# Patient Record
Sex: FEMALE | Race: WHITE | NOT HISPANIC OR LATINO | URBAN - NONMETROPOLITAN AREA
[De-identification: names, ages, dates, MRNs, and addresses within clinical notes are randomized per-mention and may not be internally consistent; named-entity substitution may affect disease eponyms.]

---

## 2018-05-23 ENCOUNTER — OFFICE VISIT (OUTPATIENT)
Dept: OBSTETRICS AND GYNECOLOGY | Facility: CLINIC | Age: 28
End: 2018-05-23

## 2018-05-23 VITALS
HEIGHT: 64 IN | BODY MASS INDEX: 28.1 KG/M2 | RESPIRATION RATE: 14 BRPM | WEIGHT: 164.6 LBS | DIASTOLIC BLOOD PRESSURE: 78 MMHG | SYSTOLIC BLOOD PRESSURE: 102 MMHG | HEART RATE: 84 BPM

## 2018-05-23 DIAGNOSIS — N92.0 MENORRHAGIA WITH REGULAR CYCLE: Primary | ICD-10-CM

## 2018-05-23 DIAGNOSIS — N94.6 DYSMENORRHEA: ICD-10-CM

## 2018-05-23 PROCEDURE — 99213 OFFICE O/P EST LOW 20 MIN: CPT | Performed by: NURSE PRACTITIONER

## 2018-05-23 RX ORDER — NORETHINDRONE ACETATE AND ETHINYL ESTRADIOL 1MG-20(21)
1 KIT ORAL DAILY
Qty: 28 TABLET | Refills: 3 | Status: SHIPPED | OUTPATIENT
Start: 2018-05-23 | End: 2018-08-30

## 2018-05-23 NOTE — PROGRESS NOTES
Debra Lovelace is a 28 y.o. female.     History of Present Illness   Pt presents with concerns about heavy painful periods. Patient's last menstrual period was 05/03/2018 (exact date). Lasted 10 days, extremely painful with clotting half dollar size. Her periods are typically regular with occasionally skipping 1 month. Periods associated with low back pain, cramping and at times painful bowel movements and intercourse.     Pt has been trying to conceive for 1.5 years. Not actively timing intercourse but not preventing pregnancy. She and her , who is present at visit, state they have decided to stop trying and focus on improving her symptoms. She conceived without problem within 6 months of stopping OCP at age 26.       In January, pt thought she may have miscarried after 2 months of amenorrhea. Never had a positive test. She has hx of ovarian cysts and mother had endometriosis. She believes she may have endometriosis too.     Last pap smear 2010.     The following portions of the patient's history were reviewed and updated as appropriate: allergies, current medications, past family history, past medical history, past social history, past surgical history and problem list.    Review of Systems   Constitutional: Negative.  Negative for activity change, chills, fatigue, fever, unexpected weight gain and unexpected weight loss.   Respiratory: Negative.    Cardiovascular: Negative.    Gastrointestinal: Negative for anal bleeding, blood in stool, constipation and diarrhea.        Painful bowel movements   Endocrine: Negative for cold intolerance and heat intolerance.   Genitourinary: Positive for menstrual problem. Negative for vaginal bleeding, vaginal discharge and vaginal pain. Dyspareunia: occasional. Pelvic pain: dysmenorrhea.   Neurological: Negative for dizziness, syncope and light-headedness.   Psychiatric/Behavioral: Negative.        Objective    Vitals:    05/23/18 0829   BP: 102/78   Pulse:  84   Resp: 14     1 05/23/18  0829   Weight: 74.7 kg (164 lb 9.6 oz)     Body mass index is 28.25 kg/m².    Physical Exam   Constitutional: She is oriented to person, place, and time. She appears well-developed and well-nourished.   Cardiovascular: Normal rate, regular rhythm and normal heart sounds.    Pulmonary/Chest: Effort normal and breath sounds normal.   Genitourinary:   Genitourinary Comments: Exam deferred   Neurological: She is alert and oriented to person, place, and time.   Psychiatric: She has a normal mood and affect. Her behavior is normal.   Vitals reviewed.        Assessment/Plan   Jasmin was seen today for menometrorrhagia and dysmenorrhea.    Diagnoses and all orders for this visit:    Menorrhagia with regular cycle  -     norethindrone-ethinyl estradiol FE (JUNEL FE 1/20) 1-20 MG-MCG per tablet; Take 1 tablet by mouth Daily.    Dysmenorrhea  -     norethindrone-ethinyl estradiol FE (JUNEL FE 1/20) 1-20 MG-MCG per tablet; Take 1 tablet by mouth Daily.    Pt does not want to conceive at this time and wishes to pursue contraceptive to help improve periods.     She was instructed how to start her birth control.  It should be started quick start today.  Because it may take 1 month to become effective, the use of alternative contraception for one month was stressed.  The potential for breakthrough bleeding for up to 3 cycles was also emphasized. Discussed what to do if 1 and 2 or more days of pills are missed. Mild side effects and ACHES warning signs discussed.    Ibuprofen 600mg q 6 hrs prn for cramping; start early to get ahead of pain. Encourage exercise, multivitamin, and increased water intake. Home remedies include hot showers, heating pads, etc.     Pt is due for well woman exam. RTC in 3 months for follow up on OCP and pap smear. Patient and  verbalize understanding. All questions were answered.

## 2018-08-30 ENCOUNTER — OFFICE VISIT (OUTPATIENT)
Dept: OBSTETRICS AND GYNECOLOGY | Facility: CLINIC | Age: 28
End: 2018-08-30

## 2018-08-30 VITALS
WEIGHT: 165.6 LBS | SYSTOLIC BLOOD PRESSURE: 118 MMHG | DIASTOLIC BLOOD PRESSURE: 80 MMHG | BODY MASS INDEX: 28.27 KG/M2 | RESPIRATION RATE: 16 BRPM | HEART RATE: 87 BPM | HEIGHT: 64 IN

## 2018-08-30 DIAGNOSIS — Z01.419 ENCOUNTER FOR GYNECOLOGICAL EXAMINATION WITH PAPANICOLAOU SMEAR OF CERVIX: Primary | ICD-10-CM

## 2018-08-30 DIAGNOSIS — Z78.9 ORAL CONTRACEPTIVE INTOLERANCE: ICD-10-CM

## 2018-08-30 PROCEDURE — G0123 SCREEN CERV/VAG THIN LAYER: HCPCS | Performed by: NURSE PRACTITIONER

## 2018-08-30 PROCEDURE — 99395 PREV VISIT EST AGE 18-39: CPT | Performed by: NURSE PRACTITIONER

## 2018-08-30 RX ORDER — ACETAMINOPHEN AND CODEINE PHOSPHATE 120; 12 MG/5ML; MG/5ML
1 SOLUTION ORAL DAILY
Qty: 28 TABLET | Refills: 12 | Status: SHIPPED | OUTPATIENT
Start: 2018-08-30 | End: 2019-01-11

## 2018-08-30 NOTE — PROGRESS NOTES
"Subjective   Chief Complaint   Patient presents with   • Gynecologic Exam     well woman annual visit   • Follow-up     OCP for dysmenorrhea and menometrorrhagia     Jasmin Lovelace is a 28 y.o. year old  presenting to be seen for her annual exam.  Today she informs me that she stopped taking OCP 3 days ago, not been sexually active since. She states all of her previous dysmenorrhea and menometrorrhagia resolved with Junel FE 1/20. Periods were on 1-2 days of spotting and no cramping. She had no side effects on the pill until the 4th pack. 2-3 days into the pack she noticed a migraine and nausea. She has headaches and thought she may be sick, so she continued her pill. After a week she began with dizziness and feeling \"spaced out\". She read that these can be side effects of OCPs and she stopped use. She no longer wants to TTC and wants to continue with contraceptive measures. I discussed that I am unsure that her symptoms were directly related to OCP given it did not start until 4 months after initiation. However, pt requests another form that would reduce the risk of headaches and nausea. We discussed all progesterone only options: pills, depo provera, nexplanon, IUDs. Pt declines any devices or injections and requests the pills.     Patient's last menstrual period was 2018 (exact date). States she is only spotting today.     OB History      Para Term  AB Living    1 1       1    SAB TAB Ectopic Molar Multiple Live Births                         Current birth control method: OCP (estrogen/progesterone).    She is sexually active.  In the past 12 months there has not been new sexual partners.  Condoms are not typically used.  She would not like to be screened for STD's at today's exam.     Past 6 month menstrual history:    Cycle Frequency: regular, predictable and consistent every 28 - 32 days   Menstrual cycle character: flow is typically light   Cycle Duration: 1 - 2   Number of heavy days " "of flows: 0   Dysmenorrhea: none and is not affecting her activities of daily living   PMS: is not affecting her activities of daily living   Intermenstrual bleeding present: no   Post-coital bleeding present: no     She exercises regularly: no.  She wears her seat belt:yes.  She has concerns about domestic violence: no.  Last colonoscopy: Never  Last DEXA: Never  Last MMG: Never  Last pap: 2010  History of abnormal PAP: No    The following portions of the patient's history were reviewed and updated as appropriate:problem list, current medications, allergies, past family history, past medical history, past social history and past surgical history.    Smoking status: Never Smoker                                                              Smokeless tobacco: Never Used                        History   Alcohol Use   • Yes      Review of Systems   Constitutional: Negative.  Negative for chills and fever.   Respiratory: Negative.    Cardiovascular: Negative.    Gastrointestinal: Negative.  Negative for nausea and vomiting.   Endocrine: Negative.    Genitourinary: Positive for vaginal bleeding (currently menstruating). Negative for menstrual problem, vaginal discharge and vaginal pain.   Skin: Negative.    Neurological: Positive for dizziness and headaches. Negative for syncope and light-headedness.        While on OCP during 4th pack   Psychiatric/Behavioral: Negative for suicidal ideas. The patient is not nervous/anxious.          Objective   /80 (BP Location: Right arm, Patient Position: Sitting, Cuff Size: Adult)   Pulse 87   Resp 16   Ht 162.6 cm (64\")   Wt 75.1 kg (165 lb 9.6 oz)   LMP 08/30/2018 (Exact Date)   Breastfeeding? No   BMI 28.43 kg/m²     General:  well developed; well nourished  no acute distress   Skin:  No suspicious lesions seen   Thyroid: not examined   Breasts:  Examined in supine position  Symmetric without masses or skin dimpling  Nipples normal without inversion, lesions or " discharge  There are no palpable axillary nodes  Fibrocystic changes are present both breasts without a discrete mass   Abdomen: soft, non-tender; no masses  no umbilical or inginual hernias are present  no hepato-splenomegaly  Normal findings: bowel sounds normal   Cardiac: Heart sounds are normal.  Regular rate and rhythm without murmur, gallop or rub.   Resp: Normal expansion.  Clear to auscultation.  No rales, rhonchi, or wheezing.   Psych: alert,oriented, in NAD with a full range of affect, normal behavior and no psychotic features   Pelvis: Uterus:  Clinical staff was present for exam  External genitalia:  normal appearance of the external genitalia including Bartholin's and Ehrhardt's glands.  :  urethral meatus normal;  Vaginal:  normal pink mucosa without prolapse or lesions and blood present -  moderate amount and dark red, much more than spotting. Clot removed to visualize cervix. Warned that this may alter the pap test results. Pt voices understanding and wishes to proceed.  Cervix:  normal appearance.  Uterus:  normal size, shape and consistency  Adnexa:  normal bimanual exam of the adnexa.     Lab Review   No data reviewed    Imaging  No data reviewed       Jasmin was seen today for gynecologic exam and follow-up.    Diagnoses and all orders for this visit:    Encounter for gynecological examination with Papanicolaou smear of cervix  -     Liquid-based Pap Smear, Screening  Pap results: I will send card in mail or call if abnormal. RTC annually for well woman exams.     Oral contraceptive intolerance  -     norethindrone (MICRONOR) 0.35 MG tablet; Take 1 tablet by mouth Daily.  Pt desires progesterone only pills after discussion. See details above in HPI. I discussed the very unique differences between this and her previous combined pill. No hormone free week may result in irregular bleeding. Bleeding may be heavier than on combined pill. Cramping may return. We will try this for now, consider NuvaRing  if not doing well on POP. Stressed to pt the importance of taking at the EXACT SAME TIME every day. If off by more than 30 minutes in either direction, use back up for at least 72 hours. Pt voices complete understanding.  is present at visit and agrees to help remind her. All questions were answered. Pt to RTC with any concerns.       This note was electronically signed.    Marilee Holm, JOSE MANUEL  August 30, 2018

## 2018-09-04 LAB
LAB AP CASE REPORT: NORMAL
LAB AP GYN ADDITIONAL INFORMATION: NORMAL
LAB AP GYN OTHER FINDINGS: NORMAL
PATH INTERP SPEC-IMP: NORMAL
STAT OF ADQ CVX/VAG CYTO-IMP: NORMAL

## 2019-01-11 ENCOUNTER — TELEPHONE (OUTPATIENT)
Dept: FAMILY MEDICINE CLINIC | Facility: CLINIC | Age: 29
End: 2019-01-11

## 2019-01-11 ENCOUNTER — OFFICE VISIT (OUTPATIENT)
Dept: FAMILY MEDICINE CLINIC | Facility: CLINIC | Age: 29
End: 2019-01-11

## 2019-01-11 VITALS
BODY MASS INDEX: 28.24 KG/M2 | SYSTOLIC BLOOD PRESSURE: 120 MMHG | OXYGEN SATURATION: 98 % | TEMPERATURE: 98.4 F | WEIGHT: 165.4 LBS | DIASTOLIC BLOOD PRESSURE: 74 MMHG | HEIGHT: 64 IN | HEART RATE: 78 BPM

## 2019-01-11 DIAGNOSIS — N92.1 MENOMETRORRHAGIA: ICD-10-CM

## 2019-01-11 DIAGNOSIS — Z30.09 ENCOUNTER FOR GENERAL COUNSELING ON PRESCRIPTION OF ORAL CONTRACEPTIVES: Primary | ICD-10-CM

## 2019-01-11 PROCEDURE — 99213 OFFICE O/P EST LOW 20 MIN: CPT | Performed by: PHYSICIAN ASSISTANT

## 2019-01-11 NOTE — PROGRESS NOTES
Debra Lovelace is a 28 y.o. female.   Pt is new to me.  Contraception   Associated symptoms include abdominal pain, fatigue and nausea. Pertinent negatives include no anorexia, arthralgias, change in bowel habit, chest pain, chills, congestion, coughing, diaphoresis, fever, headaches, joint swelling, myalgias, neck pain, numbness, rash, sore throat, swollen glands, urinary symptoms, vertigo, visual change, vomiting or weakness.   Dysmenorrhea   This is a new problem. The current episode started more than 1 month ago. The problem occurs intermittently. The problem has been gradually worsening. Associated symptoms include abdominal pain, fatigue and nausea. Pertinent negatives include no anorexia, arthralgias, change in bowel habit, chest pain, chills, congestion, coughing, diaphoresis, fever, headaches, joint swelling, myalgias, neck pain, numbness, rash, sore throat, swollen glands, urinary symptoms, vertigo, visual change, vomiting or weakness. Nothing aggravates the symptoms. Treatments tried: oral contraceptives. The treatment provided mild relief.   Menorrhagia   This is a new problem. The current episode started 1 to 4 weeks ago. The problem occurs intermittently. The problem has been gradually worsening. Associated symptoms include abdominal pain, fatigue and nausea. Pertinent negatives include no anorexia, arthralgias, change in bowel habit, chest pain, chills, congestion, coughing, diaphoresis, fever, headaches, joint swelling, myalgias, neck pain, numbness, rash, sore throat, swollen glands, urinary symptoms, vertigo, visual change, vomiting or weakness. Nothing aggravates the symptoms. Treatments tried: oral contraceptive.      Pt is here today due to irregular menstrual cycle. Pt is accompanied today by her mother. Pt has been seeing JO[facility] MANUEL Valdes for gynecology. Pt has been trying progesterone only pill x 5 months. Pt complains it was doing well, but she has been having menstrual  cramps x this AM and has been having irregular menses. Pt states her periods have been occurring every 2 weeks. Pt states she had a period 2 weeks ago, lasting 4 days. Pt states her menses has begun again today. Pt states she has been having to use heavy flow pads, using x 2 per 4 hours. Pt declines nuvaring, IUD, depo provera injection, and nexplanon as she does not want to implement devices or injections. Pt has tried Junel x 1 year ago, but she had to stop this after 4 months due to migraines. Patient's LMP - began today.    OB History      Para Term  AB Living    1 1       1    SAB TAB Ectopic Molar Multiple Live Births                       Current birth control method: progesterone only pills (micronor 0.35mg tablet daily).    The following portions of the patient's history were reviewed and updated as appropriate: allergies, current medications, past family history, past medical history, past social history, past surgical history and problem list.    Review of Systems   Constitutional: Positive for fatigue. Negative for activity change, appetite change, chills, diaphoresis, fever, unexpected weight gain and unexpected weight loss.   HENT: Negative.  Negative for congestion and sore throat.    Eyes: Negative for blurred vision, double vision and visual disturbance.   Respiratory: Negative for apnea, cough, choking, chest tightness, shortness of breath, wheezing and stridor.    Cardiovascular: Negative for chest pain, palpitations and leg swelling.   Gastrointestinal: Positive for abdominal pain and nausea. Negative for abdominal distention, anorexia, change in bowel habit, constipation, diarrhea, vomiting and GERD.   Genitourinary: Positive for menorrhagia and menstrual problem. Negative for difficulty urinating, dyspareunia, dysuria and pelvic pain.   Musculoskeletal: Negative for arthralgias, joint swelling, myalgias and neck pain.   Skin: Negative for rash.   Neurological: Negative for  vertigo, weakness and numbness.       Objective   Physical Exam   Constitutional: She is oriented to person, place, and time. She appears well-developed and well-nourished. No distress.   HENT:   Head: Normocephalic and atraumatic.   Right Ear: External ear normal.   Left Ear: External ear normal.   Nose: Nose normal.   Mouth/Throat: Oropharynx is clear and moist. No oropharyngeal exudate.   Eyes: Conjunctivae and EOM are normal. Pupils are equal, round, and reactive to light.   Neck: Normal range of motion. Neck supple. No JVD present. No tracheal deviation present. No thyromegaly present.   Cardiovascular: Normal rate, regular rhythm, normal heart sounds and intact distal pulses. Exam reveals no gallop and no friction rub.   No murmur heard.  Pulmonary/Chest: Effort normal and breath sounds normal. No stridor. No respiratory distress. She has no wheezes. She has no rales. She exhibits no tenderness.   Abdominal: Soft. Bowel sounds are normal. She exhibits no distension and no mass. There is no tenderness. There is no rebound and no guarding. No hernia.   Musculoskeletal: Normal range of motion. She exhibits no edema, tenderness or deformity.   Lymphadenopathy:     She has no cervical adenopathy.   Neurological: She is alert and oriented to person, place, and time.   Skin: Skin is warm and dry. Capillary refill takes less than 2 seconds. No rash noted. She is not diaphoretic. No erythema. No pallor.   Psychiatric: She has a normal mood and affect. Her behavior is normal. Judgment and thought content normal.   Nursing note and vitals reviewed.        Assessment/Plan   Jasmin was seen today for contraception.    Diagnoses and all orders for this visit:    Menometrorrhagia  -     Norethin-Eth Estrad-Fe Biphas (LO LOESTRIN FE) 1 MG-10 MCG / 10 MCG tablet; Take 1 tablet by mouth Daily.    After discussing contraception options with pt, she states she would like to try a different combo oral contraceptive than  previously to determine if this caused her migraines and nausea as she experienced in August 2018. Pt wishes to pursue combo oral contraception to help improve periods.     Pt was instructed how to start her birth control. Advised pt it may take 1 month to become effective and the importance of alternative contraception for one month was emphasized. Advised pt on what to do if 1 and 2 or more days of pills are missed. Mild side effects and warning signs of oral contraception discussed.    Advised pt to continue ibuprofen 600mg q 6 hours prn for cramping for pain.     Advised pt she is to follow up with her women's health provider or myself in 2-3 months to discuss tolerance and symptom management with OCP.     Patient educated to follow up in 2-3 months or sooner than next scheduled appointment if symptoms worsen or do not improve. Patient stated understanding and has agreed with plan of care. After visit summary was printed and given to patient.         This document has been electronically signed by Toyin Vazquez PA-C on January 11, 2019 4:56 PM,.

## 2019-01-15 ENCOUNTER — TELEPHONE (OUTPATIENT)
Dept: FAMILY MEDICINE CLINIC | Facility: CLINIC | Age: 29
End: 2019-01-15

## 2019-01-15 RX ORDER — NORETHINDRONE ACETATE AND ETHINYL ESTRADIOL 1.5-30(21)
1 KIT ORAL DAILY
COMMUNITY
End: 2019-06-18 | Stop reason: DRUGHIGH

## 2019-01-15 NOTE — TELEPHONE ENCOUNTER
Called into the pt's pharmacy and changed her birthcontrol to a med that is covered under her insurance we cancelled the lo neisha oropeza and replaced it with Junel with iron

## 2019-06-18 ENCOUNTER — OFFICE VISIT (OUTPATIENT)
Dept: OBSTETRICS AND GYNECOLOGY | Facility: CLINIC | Age: 29
End: 2019-06-18

## 2019-06-18 VITALS
DIASTOLIC BLOOD PRESSURE: 68 MMHG | SYSTOLIC BLOOD PRESSURE: 116 MMHG | HEIGHT: 64 IN | WEIGHT: 169.2 LBS | HEART RATE: 83 BPM | BODY MASS INDEX: 28.89 KG/M2

## 2019-06-18 DIAGNOSIS — N76.0 ACUTE VAGINITIS: Primary | ICD-10-CM

## 2019-06-18 DIAGNOSIS — R51.9 FREQUENT HEADACHES: ICD-10-CM

## 2019-06-18 DIAGNOSIS — Z30.41 ORAL CONTRACEPTIVE PILL SURVEILLANCE: ICD-10-CM

## 2019-06-18 PROCEDURE — 87210 SMEAR WET MOUNT SALINE/INK: CPT | Performed by: NURSE PRACTITIONER

## 2019-06-18 PROCEDURE — 99214 OFFICE O/P EST MOD 30 MIN: CPT | Performed by: NURSE PRACTITIONER

## 2019-06-18 RX ORDER — SUMATRIPTAN 50 MG/1
TABLET, FILM COATED ORAL
Refills: 3 | COMMUNITY
Start: 2019-06-04 | End: 2019-12-04 | Stop reason: DRUGHIGH

## 2019-06-18 RX ORDER — METRONIDAZOLE 500 MG/1
500 TABLET ORAL 2 TIMES DAILY
Qty: 14 TABLET | Refills: 0 | Status: SHIPPED | OUTPATIENT
Start: 2019-06-18 | End: 2019-06-25

## 2019-06-18 RX ORDER — BUTALBITAL, ACETAMINOPHEN AND CAFFEINE 300; 40; 50 MG/1; MG/1; MG/1
1 CAPSULE ORAL 3 TIMES DAILY PRN
Refills: 0 | COMMUNITY
Start: 2019-06-04 | End: 2020-09-18

## 2019-06-18 RX ORDER — FLUCONAZOLE 150 MG/1
150 TABLET ORAL ONCE
Qty: 2 TABLET | Refills: 0 | Status: SHIPPED | OUTPATIENT
Start: 2019-06-18 | End: 2019-06-18

## 2019-06-19 NOTE — PROGRESS NOTES
"Debra Lovelace is a 29 y.o. female.     History of Present Illness   Pt presents with concerns about headaches on OCP Junel 1/20. Pt had this happen before but it didn't occur until the 4th month. We were unsure about a correlation but she wanted to change to POP pills. She took these for several months but the presented to [facility] while I was out with complaints of irregular bleeding and cramping. She was given LoLoEstrin 1/10 but it was $300/month, so she was given Junel 1/20 again. She never had a coupon card to try at the pharmacy. Headaches are frequent, several days a month in the 4th pack of Junel 1/20's. She was given Orbivan and imitrex but is having to take these frequently. She fears risks on Depo Provera, Nexplanon, and IUDs. She wishes to remain on OCPs if possible to try 1/10s with a coupon.    Pt also concerned about vaginal discharge and itching. Describes discharge as milky white with a \"bleach smell\". Itching is intense in the perineum.     Patient's last menstrual period was 05/31/2019 (approximate).     The following portions of the patient's history were reviewed and updated as appropriate: allergies, current medications, past family history, past medical history, past social history, past surgical history and problem list.    Review of Systems   Constitutional: Negative.  Negative for chills, fever and unexpected weight gain.   Respiratory: Negative.    Cardiovascular: Negative.    Gastrointestinal: Negative for nausea.   Genitourinary: Positive for vaginal discharge. Negative for dysuria, frequency, genital sores, menstrual problem, pelvic pain, vaginal bleeding and vaginal pain (itching).   Neurological: Positive for headache. Negative for syncope and light-headedness.       Objective    Vitals:    06/18/19 1102   BP: 116/68   Pulse: 83         06/18/19  1102   Weight: 76.7 kg (169 lb 3.2 oz)     Body mass index is 29.04 kg/m².    Physical Exam   Constitutional: She is oriented " to person, place, and time. She appears well-developed and well-nourished.   Cardiovascular: Normal rate, regular rhythm and normal heart sounds.   Pulmonary/Chest: Effort normal and breath sounds normal.   Genitourinary: Uterus normal and cervix normal. There is no rash, tenderness or lesion on the right labia. There is no rash, tenderness or lesion on the left labia. Right adnexum displays no mass, no tenderness and no fullness. Left adnexum displays no mass, no tenderness and no fullness. Vagina exhibits no lesion. No erythema, tenderness or bleeding in the vagina. No foreign body in the vagina. No signs of injury around the vagina. Vaginal discharge found.   Genitourinary Comments: Wet prep: positive for clue cells TNTC, hyphae, no trichomonads. Evaluated by KAROLINA Trent.    Neurological: She is alert and oriented to person, place, and time.   Vitals reviewed.        Assessment/Plan   Jasmin was seen today for contraception and vaginitis.    Diagnoses and all orders for this visit:    Acute vaginitis  -     metroNIDAZOLE (FLAGYL) 500 MG tablet; Take 1 tablet by mouth 2 (Two) Times a Day for 7 days. No alcohol up to 48 hours after last dose  -     fluconazole (DIFLUCAN) 150 MG tablet; Take 1 tablet by mouth 1 (One) Time for 1 dose. Repeat dose in 72 hours if still symptomatic    Oral contraceptive pill surveillance  -     Norethin-Eth Estrad-Fe Biphas (LO LOESTRIN FE) 1 MG-10 MCG / 10 MCG tablet; Take 1 tablet by mouth Daily.    Frequent headaches  -     Norethin-Eth Estrad-Fe Biphas (LO LOESTRIN FE) 1 MG-10 MCG / 10 MCG tablet; Take 1 tablet by mouth Daily.    Discussed options including POP again, Depo Provera, Nexplanon, IUDs or trying to see if cost is low enough for 1/10's with a coupon. Pt desires to try the coupon card first. I registered it to the patient and called in OwossoEstrin FE. Complete current pack then switch to new OCP.     Discussed findings of both BV and yeast on exam and wet prep.  Treat with Flagyl 500mg BID x 7 days and Diflucan 150mg one now and repeat in 72 hours. Pt to call if she needs more Diflucan after completion of treatment. Discussed vulvar hygiene guidelines. Pt to make changes accordingly. Pt agrees with plan of care.     RTC after 8/30/19 for complete well woman exam or sooner PRN.

## 2019-12-04 ENCOUNTER — OFFICE VISIT (OUTPATIENT)
Dept: OBSTETRICS AND GYNECOLOGY | Facility: CLINIC | Age: 29
End: 2019-12-04

## 2019-12-04 VITALS
HEART RATE: 88 BPM | DIASTOLIC BLOOD PRESSURE: 72 MMHG | SYSTOLIC BLOOD PRESSURE: 108 MMHG | BODY MASS INDEX: 28.41 KG/M2 | WEIGHT: 166.4 LBS | HEIGHT: 64 IN

## 2019-12-04 DIAGNOSIS — N76.0: Primary | ICD-10-CM

## 2019-12-04 PROCEDURE — 99214 OFFICE O/P EST MOD 30 MIN: CPT | Performed by: NURSE PRACTITIONER

## 2019-12-04 PROCEDURE — 87210 SMEAR WET MOUNT SALINE/INK: CPT | Performed by: NURSE PRACTITIONER

## 2019-12-04 RX ORDER — SUMATRIPTAN 100 MG/1
TABLET, FILM COATED ORAL
Refills: 6 | COMMUNITY
Start: 2019-10-30 | End: 2020-09-18 | Stop reason: ALTCHOICE

## 2019-12-04 RX ORDER — TOPIRAMATE 50 MG/1
CAPSULE, EXTENDED RELEASE ORAL
COMMUNITY
Start: 2019-12-03 | End: 2020-09-18 | Stop reason: DRUGHIGH

## 2019-12-04 RX ORDER — TOPIRAMATE 25 MG/1
1 CAPSULE, EXTENDED RELEASE ORAL
Refills: 5 | COMMUNITY
Start: 2019-12-02 | End: 2020-09-18 | Stop reason: DRUGHIGH

## 2019-12-04 RX ORDER — CLINDAMYCIN PHOSPHATE 20 MG/G
1 CREAM VAGINAL NIGHTLY
Qty: 40 G | Refills: 0 | Status: SHIPPED | OUTPATIENT
Start: 2019-12-04 | End: 2019-12-11

## 2019-12-04 RX ORDER — FLUCONAZOLE 150 MG/1
150 TABLET ORAL
Qty: 3 TABLET | Refills: 0 | Status: SHIPPED | OUTPATIENT
Start: 2019-12-04 | End: 2020-09-18

## 2019-12-04 RX ORDER — CITALOPRAM 20 MG/1
20 TABLET ORAL DAILY
Refills: 1 | COMMUNITY
Start: 2019-10-14 | End: 2020-09-18 | Stop reason: DRUGHIGH

## 2019-12-04 RX ORDER — FLUCONAZOLE 150 MG/1
150 TABLET ORAL
Qty: 3 TABLET | Refills: 0 | Status: SHIPPED | OUTPATIENT
Start: 2019-12-04 | End: 2019-12-04 | Stop reason: SDUPTHER

## 2019-12-04 RX ORDER — CLINDAMYCIN PHOSPHATE 20 MG/G
1 CREAM VAGINAL NIGHTLY
Qty: 40 G | Refills: 0 | Status: SHIPPED | OUTPATIENT
Start: 2019-12-04 | End: 2019-12-04 | Stop reason: SDUPTHER

## 2019-12-06 NOTE — PROGRESS NOTES
"Debra Lovelace is a 29 y.o. female.     History of Present Illness   Pt presents with complaints of vaginal swelling, pain, discharge, and burning. She describes it as \"feeling raw\". Pt is struggling to walk due to the pain when her pants are rubbing. Occurring x 3 weeks. Pt has tried Monistat 3 days and AZO for yeast. She now has a Monistat Ovule in. Has not had any relief. Symptoms are worsening. Had antibiotics over a month ago. She continues using Dove soap and following vulvar hygiene guidelines.     The following portions of the patient's history were reviewed and updated as appropriate: allergies, current medications, past family history, past medical history, past social history, past surgical history and problem list.    Review of Systems   Constitutional: Negative.  Negative for chills and fever.   Respiratory: Negative.    Cardiovascular: Negative.    Genitourinary: Positive for vaginal discharge and vaginal pain. Negative for dysuria, frequency, hematuria, menstrual problem (doing great on LoLoEstrin), pelvic pain, urgency and vaginal bleeding.        Swelling   Skin: Negative.  Negative for rash.       Objective    Vitals:    12/04/19 1122   BP: 108/72   Pulse: 88         12/04/19  1122   Weight: 75.5 kg (166 lb 6.4 oz)     Body mass index is 28.56 kg/m².    Physical Exam   Constitutional: She is oriented to person, place, and time. She appears well-developed and well-nourished.   Pt is \"waddling\" into the office to prevent pain from worsening when her pants rub the area of concern   Cardiovascular: Normal rate, regular rhythm and normal heart sounds.   Pulmonary/Chest: Effort normal and breath sounds normal.   Genitourinary: Uterus normal and cervix normal.       There is rash and tenderness on the right labia. There is no lesion, injury or Bartholin's cyst on the right labia. There is rash and tenderness on the left labia. There is no lesion, injury or Bartholin's cyst on the left labia. " Cervix does not exhibit motion tenderness. Right adnexum displays no mass, no tenderness and no fullness. Left adnexum displays no mass, no tenderness and no fullness. Vagina exhibits no lesion. There is erythema and tenderness in the vagina. No bleeding in the vagina. No foreign body in the vagina. No signs of injury around the vagina. Vaginal discharge (mix of thinker and milky thin white discharge. malodorous. ) found.   Genitourinary Comments: Edema of the labia minora and introitus. Wet prep: positive for clue cells and hyphae and WBC TNTC, no yeast buds or trichomonads. Evaluated by KAROLINA Trent.    Neurological: She is alert and oriented to person, place, and time.   Vitals reviewed.        Assessment/Plan   Jasmin was seen today for vaginitis.    Diagnoses and all orders for this visit:    Vulvovaginal infection  -     fluconazole (DIFLUCAN) 150 MG tablet; Take 1 tablet by mouth Every 72 (Seventy-Two) Hours.  -     clindamycin (CLEOCIN) 2 % vaginal cream; Insert 1 applicator into the vagina Every Night for 7 days.    Pt has taken Flagyl PO before and would prefer a cream over oral medication due to taste. Treat with clindamycin vaginal cream nightly x 7 nights. Take Diflucan now, on day 3 and day 7 of clindamycin use. Strict vulvar hygiene. No intercourse until treatment is completed and symptoms are resolved. Pt voices understanding and agreement with this plan of care. Pt is due for well woman exam. RTC after symptoms resolve for this.

## 2020-05-15 DIAGNOSIS — Z30.41 ORAL CONTRACEPTIVE PILL SURVEILLANCE: ICD-10-CM

## 2020-05-15 DIAGNOSIS — R51.9 FREQUENT HEADACHES: ICD-10-CM

## 2020-05-16 RX ORDER — NORETHINDRONE ACETATE AND ETHINYL ESTRADIOL, ETHINYL ESTRADIOL AND FERROUS FUMARATE 1MG-10(24)
KIT ORAL
Qty: 84 TABLET | Refills: 0 | Status: SHIPPED | OUTPATIENT
Start: 2020-05-16 | End: 2020-08-19

## 2020-08-18 DIAGNOSIS — Z30.41 ORAL CONTRACEPTIVE PILL SURVEILLANCE: ICD-10-CM

## 2020-08-18 DIAGNOSIS — R51.9 FREQUENT HEADACHES: ICD-10-CM

## 2020-08-19 RX ORDER — NORETHINDRONE ACETATE AND ETHINYL ESTRADIOL, ETHINYL ESTRADIOL AND FERROUS FUMARATE 1MG-10(24)
KIT ORAL
Qty: 28 TABLET | Refills: 0 | Status: SHIPPED | OUTPATIENT
Start: 2020-08-19 | End: 2020-09-18 | Stop reason: SDUPTHER

## 2020-09-18 ENCOUNTER — LAB (OUTPATIENT)
Dept: LAB | Facility: OTHER | Age: 30
End: 2020-09-18

## 2020-09-18 ENCOUNTER — OFFICE VISIT (OUTPATIENT)
Dept: OBSTETRICS AND GYNECOLOGY | Facility: CLINIC | Age: 30
End: 2020-09-18

## 2020-09-18 VITALS
SYSTOLIC BLOOD PRESSURE: 100 MMHG | HEART RATE: 81 BPM | BODY MASS INDEX: 28.71 KG/M2 | HEIGHT: 64 IN | WEIGHT: 168.2 LBS | DIASTOLIC BLOOD PRESSURE: 60 MMHG

## 2020-09-18 DIAGNOSIS — Z30.41 ORAL CONTRACEPTIVE PILL SURVEILLANCE: ICD-10-CM

## 2020-09-18 DIAGNOSIS — R51.9 FREQUENT HEADACHES: ICD-10-CM

## 2020-09-18 DIAGNOSIS — Z01.419 ENCOUNTER FOR GYNECOLOGICAL EXAMINATION WITH PAPANICOLAOU SMEAR OF CERVIX: Primary | ICD-10-CM

## 2020-09-18 PROCEDURE — 99395 PREV VISIT EST AGE 18-39: CPT | Performed by: NURSE PRACTITIONER

## 2020-09-18 RX ORDER — MONTELUKAST SODIUM 10 MG/1
10 TABLET ORAL
COMMUNITY
Start: 2020-08-26

## 2020-09-18 RX ORDER — CITALOPRAM 40 MG/1
40 TABLET ORAL DAILY
COMMUNITY
Start: 2020-09-03 | End: 2021-09-24 | Stop reason: DRUGHIGH

## 2020-09-18 RX ORDER — TOPIRAMATE 100 MG/1
1 CAPSULE, EXTENDED RELEASE ORAL DAILY
COMMUNITY
Start: 2020-09-03

## 2020-09-18 RX ORDER — ONDANSETRON 4 MG/1
TABLET, FILM COATED ORAL
COMMUNITY
Start: 2020-08-18

## 2020-09-18 RX ORDER — NORETHINDRONE ACETATE AND ETHINYL ESTRADIOL, ETHINYL ESTRADIOL AND FERROUS FUMARATE 1MG-10(24)
1 KIT ORAL DAILY
Qty: 28 TABLET | Refills: 12 | Status: SHIPPED | OUTPATIENT
Start: 2020-09-18 | End: 2021-09-24 | Stop reason: SDUPTHER

## 2020-09-18 RX ORDER — UBROGEPANT 50 MG/1
TABLET ORAL
COMMUNITY
Start: 2020-09-03

## 2020-09-18 NOTE — PROGRESS NOTES
Subjective   Chief Complaint   Patient presents with   • Gynecologic Exam     GAVINO Lovelace is a 30 y.o. year old  presenting to be seen for her annual exam.  Today she needs refills on OCP. She is doing well taking it continuously to suppress periods. Headaches are manageable at this time. She still voices understanding of topamax reducing OCP effectiveness.     No S/S of vaginitis. Last treated in 2019.    Patient's last menstrual period was 2020 (approximate). Had period during missed pills after wisdom teeth surgery. Back on track now.      OB History        1    Para   1    Term                AB        Living   1       SAB        TAB        Ectopic        Molar        Multiple        Live Births                    Current birth control method: OCP (estrogen/progesterone).    She is sexually active.  In the past 12 months there has not been new sexual partners.  Condoms are not typically used.  She would not like to be screened for STD's at today's exam.     Past 6 month menstrual history:    Cycle Frequency: infrequent  absent   Menstrual cycle character: flow has been absent   Cycle Duration: 0-5   Number of heavy days of flows: 0   Dysmenorrhea: none and is not affecting her activities of daily living   PMS: is not affecting her activities of daily living   Intermenstrual bleeding present: no   Post-coital bleeding present: no     She exercises regularly: no.  She wears her seat belt:yes.  She has concerns about domestic violence: no.  Last colonoscopy: Never  Last DEXA: Never  Last MMG: Never  Last pap: 2018  History of abnormal PAP: No    The following portions of the patient's history were reviewed and updated as appropriate:problem list, current medications, allergies, past family history, past medical history, past social history and past surgical history.    Social History    Tobacco Use      Smoking status: Never Smoker      Smokeless tobacco: Never  "Used    Social History     Substance and Sexual Activity   Alcohol Use Yes      Review of Systems   Constitutional: Negative.  Negative for chills and fever.   Respiratory: Negative.    Cardiovascular: Negative.    Gastrointestinal: Negative.  Negative for nausea and vomiting.   Endocrine: Negative.    Genitourinary: Negative for dyspareunia, dysuria, menstrual problem, pelvic pain, vaginal bleeding, vaginal discharge and vaginal pain.   Skin: Negative.    Neurological: Negative for dizziness, syncope, light-headedness and headaches (managed).   Psychiatric/Behavioral: Negative for suicidal ideas. The patient is not nervous/anxious.          Objective   /60   Pulse 81   Ht 162.6 cm (64\")   Wt 76.3 kg (168 lb 3.2 oz)   LMP 08/21/2020 (Approximate)   Breastfeeding No   BMI 28.87 kg/m²     General:  well developed; well nourished  no acute distress   Skin:  No suspicious lesions seen   Thyroid: not examined   Breasts:  Examined in supine position  Symmetric without masses or skin dimpling  Nipples normal without inversion, lesions or discharge  There are no palpable axillary nodes  Fibrocystic changes are present both breasts without a discrete mass   Abdomen: soft, non-tender; no masses  no umbilical or inginual hernias are present  no hepato-splenomegaly  Normal findings: bowel sounds normal   Cardiac: Heart sounds are normal.  Regular rate and rhythm without murmur, gallop or rub.   Resp: Normal expansion.  Clear to auscultation.  No rales, rhonchi, or wheezing.   Psych: alert,oriented, in NAD with a full range of affect, normal behavior and no psychotic features   Pelvis: Uterus:  Clinical staff was present for exam  External genitalia:  normal appearance of the external genitalia including Bartholin's and [place]'s glands.  :  urethral meatus normal;  Vaginal:  normal pink mucosa without prolapse or lesions  Cervix:  normal appearance.  Uterus:  normal size, shape and consistency  Adnexa:  normal " bimanual exam of the adnexa.  Rectal: normal external exam     Lab Review   No data reviewed    Imaging  No data reviewed       Jasmin was seen today for gynecologic exam.    Diagnoses and all orders for this visit:    Encounter for gynecological examination with Papanicolaou smear of cervix  -     Liquid-based Pap Smear, Screening    Oral contraceptive pill surveillance  -     Norethin-Eth Estrad-Fe Biphas (Lo Loestrin Fe) 1 MG-10 MCG / 10 MCG tablet; Take 1 tablet by mouth Daily.    Frequent headaches  -     Norethin-Eth Estrad-Fe Biphas (Lo Loestrin Fe) 1 MG-10 MCG / 10 MCG tablet; Take 1 tablet by mouth Daily.    Pap results: I will send card in mail or call if abnormal. RTC annually for well woman exams.     Continue OCP as prescribed. Pt voices understanding of interaction with Topamax. RTC with any concerns.     This note was electronically signed.    Marilee Holm, APRN  September 18, 2020

## 2020-09-24 LAB
LAB AP CASE REPORT: NORMAL
PATH INTERP SPEC-IMP: NORMAL

## 2021-09-24 ENCOUNTER — LAB (OUTPATIENT)
Dept: LAB | Facility: OTHER | Age: 31
End: 2021-09-24

## 2021-09-24 ENCOUNTER — OFFICE VISIT (OUTPATIENT)
Dept: OBSTETRICS AND GYNECOLOGY | Facility: CLINIC | Age: 31
End: 2021-09-24

## 2021-09-24 VITALS
HEART RATE: 97 BPM | SYSTOLIC BLOOD PRESSURE: 118 MMHG | HEIGHT: 64 IN | BODY MASS INDEX: 30.35 KG/M2 | WEIGHT: 177.8 LBS | DIASTOLIC BLOOD PRESSURE: 76 MMHG

## 2021-09-24 DIAGNOSIS — Z01.419 ENCOUNTER FOR GYNECOLOGICAL EXAMINATION WITH PAPANICOLAOU SMEAR OF CERVIX: Primary | ICD-10-CM

## 2021-09-24 DIAGNOSIS — Z30.41 ORAL CONTRACEPTIVE PILL SURVEILLANCE: ICD-10-CM

## 2021-09-24 PROCEDURE — 99395 PREV VISIT EST AGE 18-39: CPT | Performed by: NURSE PRACTITIONER

## 2021-09-24 RX ORDER — NORETHINDRONE ACETATE AND ETHINYL ESTRADIOL, ETHINYL ESTRADIOL AND FERROUS FUMARATE 1MG-10(24)
1 KIT ORAL DAILY
Qty: 28 TABLET | Refills: 12 | Status: SHIPPED | OUTPATIENT
Start: 2021-09-24 | End: 2022-12-02 | Stop reason: SDUPTHER

## 2021-09-24 RX ORDER — CITALOPRAM 20 MG/1
20 TABLET ORAL DAILY
COMMUNITY
Start: 2021-09-01 | End: 2022-12-02

## 2021-09-24 RX ORDER — MOMETASONE FUROATE 1 MG/G
CREAM TOPICAL
COMMUNITY
Start: 2021-08-13

## 2021-09-24 RX ORDER — FEXOFENADINE HCL 180 MG/1
180 TABLET ORAL DAILY
COMMUNITY
Start: 2021-08-13

## 2021-09-24 NOTE — PROGRESS NOTES
Subjective   Chief Complaint   Patient presents with   • Annual Exam     WWE   • Contraception     refill OCP     Jasmin Lovelace is a 31 y.o. year old  presenting to be seen for her annual exam.  Today she needs refills on OCP. She is doing well taking it continuously to suppress periods. Headaches are manageable at this time. She still voices understanding of topamax reducing OCP effectiveness.     No LMP recorded (lmp unknown). (Menstrual status: Oral contraceptives).     OB History        1    Para   1    Term                AB        Living   1       SAB        TAB        Ectopic        Molar        Multiple        Live Births                    Current birth control method: OCP (estrogen/progesterone).    She is sexually active.  In the past 12 months there has not been new sexual partners.  Condoms are not typically used.  She would not like to be screened for STD's at today's exam.     Past 6 month menstrual history:    Cycle Frequency: absent   Menstrual cycle character: flow has been absent   Cycle Duration: 0-0   Number of heavy days of flows: 0   Dysmenorrhea: none   PMS: is not affecting her activities of daily living   Intermenstrual bleeding present: no   Post-coital bleeding present: no     She exercises regularly: no.  She wears her seat belt:yes.  She has concerns about domestic violence: no.  Last colonoscopy: Never  Last DEXA: Never  Last MMG: Never  Last pap: 20  History of abnormal PAP: No    The following portions of the patient's history were reviewed and updated as appropriate:problem list, current medications, allergies, past family history, past medical history, past social history and past surgical history.    Social History    Tobacco Use      Smoking status: Never Smoker      Smokeless tobacco: Never Used    Social History     Substance and Sexual Activity   Alcohol Use Yes      Review of Systems   Constitutional: Negative.  Negative for chills and fever.  "  Respiratory: Negative.    Cardiovascular: Negative.    Gastrointestinal: Negative.  Negative for nausea and vomiting.   Endocrine: Negative.    Genitourinary: Negative for dyspareunia, dysuria, menstrual problem, pelvic pain, vaginal bleeding, vaginal discharge and vaginal pain.   Skin: Negative.    Neurological: Negative for dizziness, syncope, light-headedness and headaches (managed).   Psychiatric/Behavioral: Negative for suicidal ideas. The patient is not nervous/anxious.          Objective   /76   Pulse 97   Ht 162.6 cm (64\")   Wt 80.6 kg (177 lb 12.8 oz)   LMP  (LMP Unknown) Comment: period about 6 months ago  Breastfeeding No   BMI 30.52 kg/m²     General:  well developed; well nourished  no acute distress   Skin:  No suspicious lesions seen   Thyroid: not examined   Breasts:  Examined in supine position  Symmetric without masses or skin dimpling  Nipples normal without inversion, lesions or discharge  There are no palpable axillary nodes  Fibrocystic changes are present both breasts without a discrete mass   Abdomen: soft, non-tender; no masses  no umbilical or inginual hernias are present  no hepato-splenomegaly  Normal findings: bowel sounds normal   Cardiac: Heart sounds are normal.  Regular rate and rhythm without murmur, gallop or rub.   Resp: Normal expansion.  Clear to auscultation.  No rales, rhonchi, or wheezing.   Psych: alert,oriented, in NAD with a full range of affect, normal behavior and no psychotic features   Pelvis: Uterus:  Clinical staff was present for exam  External genitalia:  normal appearance of the external genitalia including Bartholin's and [place]'s glands.  :  urethral meatus normal;  Vaginal:  normal pink mucosa without prolapse or lesions  Cervix:  normal appearance, ectopy present  Uterus:  normal size, shape and consistency  Adnexa:  normal bimanual exam of the adnexa.  Rectal: normal external exam     Lab Review   No data reviewed    Imaging  No data " reviewed       Diagnoses and all orders for this visit:    1. Encounter for gynecological examination with Papanicolaou smear of cervix (Primary)  -     Liquid-based Pap Smear, Screening    2. Oral contraceptive pill surveillance  -     Norethin-Eth Estrad-Fe Biphas (Lo Loestrin Fe) 1 MG-10 MCG / 10 MCG tablet; Take 1 tablet by mouth Daily.  Dispense: 28 tablet; Refill: 12       Pap results: I will send card in mail or call if abnormal. RTC annually for well woman exams.    SBE encouraged monthly.     Pt is  and monogamous. No risk of STI.      Uses OCP for contraception. Continue OCP as prescribed. Pt voices understanding of interaction with Topamax. RTC with any concerns.     Continue following with PCP PRN. Pt has had COVID vaccination.     This note was electronically signed.    Marilee Holm, APRN  September 24, 2021

## 2021-09-28 LAB
LAB AP CASE REPORT: NORMAL
PATH INTERP SPEC-IMP: NORMAL

## 2022-12-02 ENCOUNTER — LAB (OUTPATIENT)
Dept: LAB | Facility: OTHER | Age: 32
End: 2022-12-02

## 2022-12-02 ENCOUNTER — OFFICE VISIT (OUTPATIENT)
Dept: GASTROENTEROLOGY | Facility: CLINIC | Age: 32
End: 2022-12-02

## 2022-12-02 ENCOUNTER — OFFICE VISIT (OUTPATIENT)
Dept: OBSTETRICS AND GYNECOLOGY | Facility: CLINIC | Age: 32
End: 2022-12-02

## 2022-12-02 VITALS
HEART RATE: 90 BPM | SYSTOLIC BLOOD PRESSURE: 120 MMHG | DIASTOLIC BLOOD PRESSURE: 82 MMHG | BODY MASS INDEX: 31.86 KG/M2 | HEIGHT: 64 IN | WEIGHT: 186.6 LBS

## 2022-12-02 VITALS
HEIGHT: 64 IN | WEIGHT: 186 LBS | BODY MASS INDEX: 31.76 KG/M2 | SYSTOLIC BLOOD PRESSURE: 120 MMHG | HEART RATE: 90 BPM | DIASTOLIC BLOOD PRESSURE: 82 MMHG

## 2022-12-02 DIAGNOSIS — K92.1: ICD-10-CM

## 2022-12-02 DIAGNOSIS — R10.814 LEFT LOWER QUADRANT ABDOMINAL TENDERNESS WITHOUT REBOUND TENDERNESS: Primary | ICD-10-CM

## 2022-12-02 DIAGNOSIS — Z30.41 ORAL CONTRACEPTIVE PILL SURVEILLANCE: ICD-10-CM

## 2022-12-02 DIAGNOSIS — K92.1 HEMATOCHEZIA: ICD-10-CM

## 2022-12-02 DIAGNOSIS — N91.2 AMENORRHEA DUE TO ORAL CONTRACEPTIVE: ICD-10-CM

## 2022-12-02 DIAGNOSIS — Z79.3 AMENORRHEA DUE TO ORAL CONTRACEPTIVE: ICD-10-CM

## 2022-12-02 DIAGNOSIS — Z01.419 ENCOUNTER FOR GYNECOLOGICAL EXAMINATION WITH PAPANICOLAOU SMEAR OF CERVIX: Primary | ICD-10-CM

## 2022-12-02 LAB
B-HCG UR QL: NEGATIVE
EXPIRATION DATE: NORMAL
INTERNAL NEGATIVE CONTROL: NORMAL
INTERNAL POSITIVE CONTROL: NORMAL
Lab: NORMAL

## 2022-12-02 PROCEDURE — 99203 OFFICE O/P NEW LOW 30 MIN: CPT | Performed by: PHYSICIAN ASSISTANT

## 2022-12-02 PROCEDURE — 99395 PREV VISIT EST AGE 18-39: CPT | Performed by: NURSE PRACTITIONER

## 2022-12-02 PROCEDURE — 81025 URINE PREGNANCY TEST: CPT | Performed by: NURSE PRACTITIONER

## 2022-12-02 RX ORDER — NORETHINDRONE ACETATE AND ETHINYL ESTRADIOL, ETHINYL ESTRADIOL AND FERROUS FUMARATE 1MG-10(24)
1 KIT ORAL DAILY
Qty: 28 TABLET | Refills: 12 | Status: SHIPPED | OUTPATIENT
Start: 2022-12-02

## 2022-12-02 RX ORDER — CETIRIZINE HYDROCHLORIDE 10 MG/1
10 TABLET ORAL DAILY
COMMUNITY

## 2022-12-02 RX ORDER — VENLAFAXINE HYDROCHLORIDE 37.5 MG/1
37.5 CAPSULE, EXTENDED RELEASE ORAL DAILY
COMMUNITY

## 2022-12-02 NOTE — PROGRESS NOTES
[facility]      Chief Complaint:   Chief Complaint   Patient presents with   • Black or Bloody Stool       Subjective     HPI:   Patient presents for evaluation of recent episode of hematochezia.  Patient states her abdomen began cramping 4 nights ago and she had a subsequent episode of passing dark red blood and clots without stool.  Patient states the blood also appeared to have mucus mixed with it.  Patient denies any further episodes of hematochezia.  Patient states longstanding history of constipation, typically has a bowel movement every 3 days.  Patient has never had an incident of hematochezia.  Patient states her abdominal pain today is 4/10.  Patient denies GERD symptoms, nausea/vomiting or dysphagia.  Patient denies a family history of GI tract diseases.    Plan: Patient will be scheduled for CT scan of the abdomen due to hematochezia and left lower quadrant tenderness.  Patient states she works 12-hour shifts and will be unable to have this completed until Wednesday.  Advised that if her pain worsened, bleeding recurred or she develop fever she should immediately go to the ER for further evaluation.  Patient voiced understanding.  I will see her in follow-up as soon as CT scan has been completed to review results and make further recommendations based upon those results.    Past Medical History:   Past Medical History:   Diagnosis Date   • Anxiety    • Bipolar disorder (HCC)    • Depression    • Endometriosis    • Ovarian cyst    • Urinary tract infection        Past Surgical History:  Past Surgical History:   Procedure Laterality Date   • TONSILLECTOMY     • WISDOM TOOTH EXTRACTION  08/2020       Family History:  Family History   Problem Relation Age of Onset   • Uterine cancer Mother    • Ovarian cancer Mother    • Breast cancer Paternal Grandmother        Social History:   reports that she has never smoked. She has never used smokeless tobacco. She reports  current alcohol use. She reports that she does not use drugs.    Medications:   Prior to Admission medications    Medication Sig Start Date End Date Taking? Authorizing Provider   cetirizine (zyrTEC) 10 MG tablet Take 10 mg by mouth Daily.   Yes Kuldeep Francisco MD   fexofenadine (ALLEGRA) 180 MG tablet Take 180 mg by mouth Daily. 8/13/21  Yes Kuldeep Francisco MD   mometasone (ELOCON) 0.1 % cream APPLY TOPICALLY TO AFFECTED AREA TWICE DAILY AVOID USE ON FACE 8/13/21  Yes Kuldeep Francisco MD   montelukast (SINGULAIR) 10 MG tablet Take 10 mg by mouth every night at bedtime. 8/26/20  Yes Kuldeep Francisco MD   Multiple Vitamin (MULTI-VITAMIN DAILY PO) Take  by mouth.   Yes Kuldeep Francisco MD   Norethin-Eth Estrad-Fe Biphas (Lo Loestrin Fe) 1 MG-10 MCG / 10 MCG tablet Take 1 tablet by mouth Daily. 12/2/22  Yes Marilee Holm APRN   ondansetron (ZOFRAN) 4 MG tablet TAKE 1 TABLET BY MOUTH EVERY 4 TO 6 HOURS AS NEEDED 8/18/20  Yes Kuldeep Francisco MD   Trokendi  MG capsule sustained-release 24 hr Take 1 capsule by mouth Daily. 9/3/20  Yes Kuldeep Francisco MD   ubrogepant tablet TAKE 1 TABLET BY MOUTH AT ONSET OF MIGRAINE AND IN 2 HOURS MAY REPEAT DOSE... DO NOT EXCEED 2 TABLETS IN A 24 HOUR PERIOD 9/3/20  Yes Kuldeep Francisco MD   venlafaxine XR (EFFEXOR-XR) 37.5 MG 24 hr capsule Take 37.5 mg by mouth Daily.   Yes Kuldeep Francisco MD   citalopram (CeleXA) 20 MG tablet Take 20 mg by mouth Daily. 9/1/21 12/2/22  Kuldeep Francisco MD   Loratadine (CLARITIN PO) Take  by mouth.  12/2/22  Kuldeep Francisco MD   Norethin-Eth Estrad-Fe Biphas (Lo Loestrin Fe) 1 MG-10 MCG / 10 MCG tablet Take 1 tablet by mouth Daily. 9/24/21 12/2/22  Marilee Holm APRN       Allergies:  Patient has no known allergies.    ROS:    Review of Systems   Constitutional: Negative.  Negative for fever and unexpected weight change.   HENT: Negative.    Eyes:  "Negative.    Respiratory: Negative.  Negative for shortness of breath.    Cardiovascular: Negative.  Negative for chest pain.   Gastrointestinal: Positive for abdominal distention, abdominal pain, blood in stool and constipation. Negative for anal bleeding, diarrhea, nausea, rectal pain and vomiting.   Endocrine: Negative.    Genitourinary: Negative.    Skin: Negative.    Neurological: Negative.    Hematological: Negative.    Psychiatric/Behavioral: Negative.      Objective     Blood pressure 120/82, pulse 90, height 162.6 cm (64\"), weight 84.4 kg (186 lb), not currently breastfeeding.    Physical Exam  Vitals and nursing note reviewed.   Constitutional:       Appearance: Normal appearance.   HENT:      Head: Normocephalic and atraumatic.   Eyes:      General: No scleral icterus.     Conjunctiva/sclera: Conjunctivae normal.   Cardiovascular:      Rate and Rhythm: Normal rate and regular rhythm.   Pulmonary:      Effort: Pulmonary effort is normal.      Breath sounds: Normal breath sounds.   Abdominal:      General: Bowel sounds are normal.      Palpations: Abdomen is soft.      Tenderness: There is abdominal tenderness in the left upper quadrant and left lower quadrant. There is no guarding or rebound.   Skin:     General: Skin is warm.      Coloration: Skin is not jaundiced.   Neurological:      General: No focal deficit present.      Mental Status: She is alert.   Psychiatric:         Behavior: Behavior normal.          Assessment & Plan   Diagnoses and all orders for this visit:    1. Left lower quadrant abdominal tenderness without rebound tenderness (Primary)  -     CT Abdomen Pelvis With Contrast; Future    2. Hematochezia  -     CT Abdomen Pelvis With Contrast; Future        * Surgery not found *     Diagnosis Plan   1. Left lower quadrant abdominal tenderness without rebound tenderness  CT Abdomen Pelvis With Contrast      2. Hematochezia  CT Abdomen Pelvis With Contrast          Anticipated Surgical " Procedure:  Orders Placed This Encounter   Procedures   • CT Abdomen Pelvis With Contrast     Standing Status:   Future     Standing Expiration Date:   12/2/2023     Order Specific Question:   Will Oral Contrast be needed for this procedure?     Answer:   Yes     Order Specific Question:   Patient Pregnant     Answer:   No       The risks, benefits, and alternatives of this procedure have been discussed with the patient or the responsible party- the patient understands and agrees to proceed.

## 2022-12-02 NOTE — PROGRESS NOTES
"Subjective   Chief Complaint   Patient presents with   • Gynecologic Exam     WWE   • Contraception     Refill OCP       Jasmin Lovelace is a 32 y.o. year old  presenting to be seen for her annual exam.  Today she needs refills on OCP. She is doing well taking it continuously to suppress periods. Headaches are manageable at this time. She still voices understanding of topamax reducing OCP effectiveness.     Has concerns about blood, mucus loose stool Tuesday. No BM since. She has upper abdominal tenderness. Never occurred before. Denies pain with her bowel movements. Typically has \"normal stools\" every 3 days. Denies any known hemorrhoids. Never seen GI before. She has had fluctuations in weight. Up and down 20lb without changes in her diet or exercise routine.     No LMP recorded (lmp unknown). (Menstrual status: Oral contraceptives).     OB History        2    Para   1    Term                AB   1    Living   1       SAB   1    IAB        Ectopic        Molar        Multiple        Live Births                    Current birth control method: OCP (estrogen/progesterone).    She is sexually active.  In the past 12 months there has not been new sexual partners.  Condoms are not typically used.  She would not like to be screened for STD's at today's exam.     Past 6 month menstrual history:    Cycle Frequency: absent   Menstrual cycle character: flow has been absent   Cycle Duration: 0-0   Number of heavy days of flows: 0   Dysmenorrhea: none   PMS: is not affecting her activities of daily living   Intermenstrual bleeding present: no   Post-coital bleeding present: no     She exercises regularly: no.  She wears her seat belt:yes.  She has concerns about domestic violence: no.  Last colonoscopy: Never  Last DEXA: Never  Last MMG: Never  Last pap: 21  History of abnormal PAP: No    The following portions of the patient's history were reviewed and updated as appropriate:problem list, current " "medications, allergies, past family history, past medical history, past social history and past surgical history.    Social History    Tobacco Use      Smoking status: Never      Smokeless tobacco: Never    Social History     Substance and Sexual Activity   Alcohol Use Yes      Review of Systems   Constitutional: Negative.  Negative for chills and fever.   Respiratory: Negative.    Cardiovascular: Negative.    Gastrointestinal: Positive for abdominal distention, abdominal pain, blood in stool and diarrhea. Negative for anal bleeding, constipation, nausea, rectal pain and vomiting.   Endocrine: Negative.    Genitourinary: Negative for dyspareunia, dysuria, menstrual problem, pelvic pain, vaginal bleeding, vaginal discharge and vaginal pain.   Skin: Negative.    Neurological: Negative for dizziness, syncope, light-headedness and headaches (managed).   Psychiatric/Behavioral: Negative for suicidal ideas. The patient is not nervous/anxious.          Objective   /82   Pulse 90   Ht 162.6 cm (64\")   Wt 84.6 kg (186 lb 9.6 oz)   LMP  (LMP Unknown)   Breastfeeding No   BMI 32.03 kg/m²     General:  well developed; well nourished  no acute distress  obese - Body mass index is 32.03 kg/m².   Skin:  No suspicious lesions seen   Thyroid: not examined   Breasts:  Examined in supine position  Symmetric without masses or skin dimpling  Nipples normal without inversion, lesions or discharge  There are no palpable axillary nodes  Fibrocystic changes are present both breasts without a discrete mass   Abdomen: no umbilical or inguinal hernias are present  no hepato-splenomegaly  Normal findings: bowel sounds normal  Abnormal findings: moderate tenderness in the epigastrium   Cardiac: Heart sounds are normal.  Regular rate and rhythm without murmur, gallop or rub.   Resp: Normal expansion.  Clear to auscultation.  No rales, rhonchi, or wheezing.   Psych: alert,oriented, in NAD with a full range of affect, normal behavior " and no psychotic features   Pelvis: Uterus:  Clinical staff was present for exam  External genitalia:  normal appearance of the external genitalia including Bartholin's and [place]'s glands.  :  urethral meatus normal;  Vaginal:  normal pink mucosa without prolapse or lesions  Cervix:  normal appearance, ectopy present  Uterus:  normal size, shape and consistency  Adnexa:  normal bimanual exam of the adnexa.  Rectal: normal external exam     Lab Review   No data reviewed    Imaging  No data reviewed       Diagnoses and all orders for this visit:    1. Encounter for gynecological examination with Papanicolaou smear of cervix (Primary)  -     LIQUID-BASED PAP SMEAR, [facility] ([facility])    2. Oral contraceptive pill surveillance  -     Norethin-Eth Estrad-Fe Biphas (Lo Loestrin Fe) 1 MG-10 MCG / 10 MCG tablet; Take 1 tablet by mouth Daily.  Dispense: 28 tablet; Refill: 12    3. Blood in stool, adrian  -     Ambulatory Referral to Gastroenterology       Pap results: I will send card in mail or call if abnormal. RTC annually for well woman exams.    SBE encouraged monthly.     Pt is  and monogamous. No risk of STI.      Uses OCP for contraception. Continue OCP as prescribed. Pt voices understanding of interaction with Topamax. RTC with any concerns.     Continue following with PCP PRN.     Referral made to [facility] for further evaluation of abdominal pain and blood, mucus in stools.     This note was electronically signed.    Marilee Holm, APRN  December 2, 2022

## 2022-12-06 LAB — REF LAB TEST METHOD: NORMAL

## 2022-12-09 ENCOUNTER — OFFICE VISIT (OUTPATIENT)
Dept: GASTROENTEROLOGY | Facility: CLINIC | Age: 32
End: 2022-12-09

## 2022-12-09 VITALS
WEIGHT: 188 LBS | SYSTOLIC BLOOD PRESSURE: 122 MMHG | BODY MASS INDEX: 32.1 KG/M2 | DIASTOLIC BLOOD PRESSURE: 84 MMHG | HEART RATE: 91 BPM | HEIGHT: 64 IN

## 2022-12-09 DIAGNOSIS — R10.13 EPIGASTRIC PAIN: ICD-10-CM

## 2022-12-09 DIAGNOSIS — R10.814 LEFT LOWER QUADRANT ABDOMINAL TENDERNESS WITHOUT REBOUND TENDERNESS: ICD-10-CM

## 2022-12-09 DIAGNOSIS — K92.1 HEMATOCHEZIA: Primary | ICD-10-CM

## 2022-12-09 DIAGNOSIS — K59.00 CONSTIPATION, UNSPECIFIED CONSTIPATION TYPE: ICD-10-CM

## 2022-12-09 DIAGNOSIS — K21.9 GASTROESOPHAGEAL REFLUX DISEASE, UNSPECIFIED WHETHER ESOPHAGITIS PRESENT: ICD-10-CM

## 2022-12-09 PROCEDURE — 99214 OFFICE O/P EST MOD 30 MIN: CPT | Performed by: PHYSICIAN ASSISTANT

## 2022-12-09 RX ORDER — POLYETHYLENE GLYCOL 3350 17 G/17G
17 POWDER, FOR SOLUTION ORAL DAILY
Qty: 30 PACKET | Refills: 1 | Status: SHIPPED | OUTPATIENT
Start: 2022-12-09

## 2022-12-09 RX ORDER — SODIUM CHLORIDE 9 MG/ML
40 INJECTION, SOLUTION INTRAVENOUS AS NEEDED
Status: CANCELLED | OUTPATIENT
Start: 2022-12-09

## 2022-12-09 RX ORDER — DEXTROSE AND SODIUM CHLORIDE 5; .45 G/100ML; G/100ML
30 INJECTION, SOLUTION INTRAVENOUS CONTINUOUS PRN
Status: CANCELLED | OUTPATIENT
Start: 2023-01-03

## 2022-12-09 NOTE — PROGRESS NOTES
[facility]      Chief Complaint:   Chief Complaint   Patient presents with   • Follow-up       Subjective     HPI:   Patient returns for follow-up after having CT scan completed due to left lower quadrant abdominal pain and tenderness.  Patient previously reported 1 episode of hematochezia with mucous  without bowel movement.  Patient reports longstanding history of constipation but no previous history of hematochezia.  Patient reports today that she is having epigastric pain.  She has a history of GERD but states symptoms are typically controlled.  Patient denies nausea or vomiting.    CT of the abdomen and pelvis with contrast shows no acute inflammatory process in the abdomen or pelvis.    Plan: Patient will be scheduled for EGD and colonoscopy for further evaluation of her epigastric pain, GERD, chronic constipation, left lower quadrant pain and hematochezia.  Patient was prescribed MiraLAX for patient.  Patient will follow-up after endoscopy has been completed for results and recommendations.    Past Medical History:   Past Medical History:   Diagnosis Date   • Anxiety    • Bipolar disorder (HCC)    • Depression    • Endometriosis    • Ovarian cyst    • Urinary tract infection        Past Surgical History:  Past Surgical History:   Procedure Laterality Date   • TONSILLECTOMY     • WISDOM TOOTH EXTRACTION  08/2020       Family History:  Family History   Problem Relation Age of Onset   • Uterine cancer Mother    • Ovarian cancer Mother    • Breast cancer Paternal Grandmother        Social History:   reports that she has never smoked. She has never used smokeless tobacco. She reports current alcohol use. She reports that she does not use drugs.    Medications:   Prior to Admission medications    Medication Sig Start Date End Date Taking? Authorizing Provider   cetirizine (zyrTEC) 10 MG tablet Take 10 mg by mouth Daily.    Provider, MD Kuldeep   fexofenadine (ALLEGRA)  180 MG tablet Take 180 mg by mouth Daily. 8/13/21   Kuldeep Francisco MD   mometasone (ELOCON) 0.1 % cream APPLY TOPICALLY TO AFFECTED AREA TWICE DAILY AVOID USE ON FACE 8/13/21   Kuldeep Francisco MD   montelukast (SINGULAIR) 10 MG tablet Take 10 mg by mouth every night at bedtime. 8/26/20   Kuldeep Francisco MD   Multiple Vitamin (MULTI-VITAMIN DAILY PO) Take  by mouth.    Kuldeep Francisco MD   Norethin-Eth Estrad-Fe Biphas (Lo Loestrin Fe) 1 MG-10 MCG / 10 MCG tablet Take 1 tablet by mouth Daily. 12/2/22   Marilee Holm APRN   ondansetron (ZOFRAN) 4 MG tablet TAKE 1 TABLET BY MOUTH EVERY 4 TO 6 HOURS AS NEEDED 8/18/20   Kuldeep Francisco MD   polyethylene glycol (MIRALAX) 17 g packet Take 17 g by mouth Daily. 12/9/22   Melinda Foster, PENG   Trokendi  MG capsule sustained-release 24 hr Take 1 capsule by mouth Daily. 9/3/20   Kuldeep Francisco MD   ubrogepant tablet TAKE 1 TABLET BY MOUTH AT ONSET OF MIGRAINE AND IN 2 HOURS MAY REPEAT DOSE... DO NOT EXCEED 2 TABLETS IN A 24 HOUR PERIOD 9/3/20   Kuldeep Francisco MD   venlafaxine XR (EFFEXOR-XR) 37.5 MG 24 hr capsule Take 37.5 mg by mouth Daily.    Kuldeep Francisco MD       Allergies:  Patient has no known allergies.    ROS:    Review of Systems   Constitutional: Negative.  Negative for fever and unexpected weight change.   HENT: Negative.  Negative for trouble swallowing.    Eyes: Negative.    Respiratory: Negative.  Negative for shortness of breath.    Cardiovascular: Negative.  Negative for chest pain.   Gastrointestinal: Positive for abdominal pain, blood in stool and constipation. Negative for abdominal distention, anal bleeding, diarrhea, nausea, rectal pain and vomiting.   Endocrine: Negative.    Genitourinary: Negative.    Skin: Negative.    Neurological: Negative.    Hematological: Negative.    Psychiatric/Behavioral: Negative.      Objective     Blood pressure 122/84, pulse 91, height 162.6 cm  "(64\"), weight 85.3 kg (188 lb), not currently breastfeeding.    Physical Exam  Vitals and nursing note reviewed.   Constitutional:       Appearance: Normal appearance.   HENT:      Head: Normocephalic.      Mouth/Throat:      Mouth: Mucous membranes are moist.      Pharynx: Oropharynx is clear.   Eyes:      General: No scleral icterus.  Cardiovascular:      Rate and Rhythm: Normal rate and regular rhythm.   Pulmonary:      Effort: Pulmonary effort is normal.      Breath sounds: Normal breath sounds.   Abdominal:      General: Bowel sounds are normal.      Palpations: Abdomen is soft.      Tenderness: There is abdominal tenderness in the right lower quadrant, suprapubic area and left lower quadrant. There is no right CVA tenderness, left CVA tenderness, guarding or rebound. Negative signs include Rovsing's sign, McBurney's sign, psoas sign and obturator sign.   Skin:     Coloration: Skin is not jaundiced.   Neurological:      General: No focal deficit present.      Mental Status: She is alert.   Psychiatric:         Behavior: Behavior normal.          Assessment & Plan   Diagnoses and all orders for this visit:    1. Hematochezia (Primary)  -     Case Request; Standing  -     sodium chloride 0.9 % infusion 40 mL  -     dextrose 5 % and sodium chloride 0.45 % infusion  -     Case Request    2. Left lower quadrant abdominal tenderness without rebound tenderness  -     Case Request; Standing  -     sodium chloride 0.9 % infusion 40 mL  -     dextrose 5 % and sodium chloride 0.45 % infusion  -     Case Request    3. Epigastric pain  -     Case Request; Standing  -     sodium chloride 0.9 % infusion 40 mL  -     dextrose 5 % and sodium chloride 0.45 % infusion  -     Case Request    4. Gastroesophageal reflux disease, unspecified whether esophagitis present  -     Case Request; Standing  -     sodium chloride 0.9 % infusion 40 mL  -     dextrose 5 % and sodium chloride 0.45 % infusion  -     Case Request    5. " Constipation, unspecified constipation type  -     Case Request; Standing  -     sodium chloride 0.9 % infusion 40 mL  -     dextrose 5 % and sodium chloride 0.45 % infusion  -     Case Request    Other orders  -     Follow Anesthesia Guidelines / Protocol; Future  -     Obtain Informed Consent; Future  -     Implement Anesthesia Orders Day of Procedure; Standing  -     Obtain Informed Consent; Standing  -     POC Glucose Once; Standing  -     Insert Peripheral IV; Standing  -     polyethylene glycol (MIRALAX) 17 g packet; Take 17 g by mouth Daily.  Dispense: 30 packet; Refill: 1        ESOPHAGOGASTRODUODENOSCOPY-bx (N/A), COLONOSCOPY- look at TI, bx (N/A)     Diagnosis Plan   1. Hematochezia  Case Request    sodium chloride 0.9 % infusion 40 mL    dextrose 5 % and sodium chloride 0.45 % infusion    Case Request      2. Left lower quadrant abdominal tenderness without rebound tenderness  Case Request    sodium chloride 0.9 % infusion 40 mL    dextrose 5 % and sodium chloride 0.45 % infusion    Case Request      3. Epigastric pain  Case Request    sodium chloride 0.9 % infusion 40 mL    dextrose 5 % and sodium chloride 0.45 % infusion    Case Request      4. Gastroesophageal reflux disease, unspecified whether esophagitis present  Case Request    sodium chloride 0.9 % infusion 40 mL    dextrose 5 % and sodium chloride 0.45 % infusion    Case Request      5. Constipation, unspecified constipation type  Case Request    sodium chloride 0.9 % infusion 40 mL    dextrose 5 % and sodium chloride 0.45 % infusion    Case Request          Anticipated Surgical Procedure:  Orders Placed This Encounter   Procedures   • Obtain Informed Consent     Standing Status:   Future     Order Specific Question:   Informed Consent Given For     Answer:   egd and colonoscopy       The risks, benefits, and alternatives of this procedure have been discussed with the patient or the responsible party- the patient understands and agrees to  proceed.

## 2022-12-09 NOTE — H&P (VIEW-ONLY)
[facility]      Chief Complaint:   Chief Complaint   Patient presents with   • Follow-up       Subjective     HPI:   Patient returns for follow-up after having CT scan completed due to left lower quadrant abdominal pain and tenderness.  Patient previously reported 1 episode of hematochezia with mucous  without bowel movement.  Patient reports longstanding history of constipation but no previous history of hematochezia.  Patient reports today that she is having epigastric pain.  She has a history of GERD but states symptoms are typically controlled.  Patient denies nausea or vomiting.    CT of the abdomen and pelvis with contrast shows no acute inflammatory process in the abdomen or pelvis.    Plan: Patient will be scheduled for EGD and colonoscopy for further evaluation of her epigastric pain, GERD, chronic constipation, left lower quadrant pain and hematochezia.  Patient was prescribed MiraLAX for patient.  Patient will follow-up after endoscopy has been completed for results and recommendations.    Past Medical History:   Past Medical History:   Diagnosis Date   • Anxiety    • Bipolar disorder (HCC)    • Depression    • Endometriosis    • Ovarian cyst    • Urinary tract infection        Past Surgical History:  Past Surgical History:   Procedure Laterality Date   • TONSILLECTOMY     • WISDOM TOOTH EXTRACTION  08/2020       Family History:  Family History   Problem Relation Age of Onset   • Uterine cancer Mother    • Ovarian cancer Mother    • Breast cancer Paternal Grandmother        Social History:   reports that she has never smoked. She has never used smokeless tobacco. She reports current alcohol use. She reports that she does not use drugs.    Medications:   Prior to Admission medications    Medication Sig Start Date End Date Taking? Authorizing Provider   cetirizine (zyrTEC) 10 MG tablet Take 10 mg by mouth Daily.    Provider, MD Kuldeep   fexofenadine (ALLEGRA)  180 MG tablet Take 180 mg by mouth Daily. 8/13/21   Kuldeep Francisco MD   mometasone (ELOCON) 0.1 % cream APPLY TOPICALLY TO AFFECTED AREA TWICE DAILY AVOID USE ON FACE 8/13/21   Kuldeep Francisco MD   montelukast (SINGULAIR) 10 MG tablet Take 10 mg by mouth every night at bedtime. 8/26/20   Kuldeep Francisco MD   Multiple Vitamin (MULTI-VITAMIN DAILY PO) Take  by mouth.    Kuldeep Francisco MD   Norethin-Eth Estrad-Fe Biphas (Lo Loestrin Fe) 1 MG-10 MCG / 10 MCG tablet Take 1 tablet by mouth Daily. 12/2/22   Marilee Holm APRN   ondansetron (ZOFRAN) 4 MG tablet TAKE 1 TABLET BY MOUTH EVERY 4 TO 6 HOURS AS NEEDED 8/18/20   Kuldeep Francisco MD   polyethylene glycol (MIRALAX) 17 g packet Take 17 g by mouth Daily. 12/9/22   Melinda Foster, PENG   Trokendi  MG capsule sustained-release 24 hr Take 1 capsule by mouth Daily. 9/3/20   Kuldeep Francisco MD   ubrogepant tablet TAKE 1 TABLET BY MOUTH AT ONSET OF MIGRAINE AND IN 2 HOURS MAY REPEAT DOSE... DO NOT EXCEED 2 TABLETS IN A 24 HOUR PERIOD 9/3/20   Kuldeep Francisco MD   venlafaxine XR (EFFEXOR-XR) 37.5 MG 24 hr capsule Take 37.5 mg by mouth Daily.    Kuldeep Francisco MD       Allergies:  Patient has no known allergies.    ROS:    Review of Systems   Constitutional: Negative.  Negative for fever and unexpected weight change.   HENT: Negative.  Negative for trouble swallowing.    Eyes: Negative.    Respiratory: Negative.  Negative for shortness of breath.    Cardiovascular: Negative.  Negative for chest pain.   Gastrointestinal: Positive for abdominal pain, blood in stool and constipation. Negative for abdominal distention, anal bleeding, diarrhea, nausea, rectal pain and vomiting.   Endocrine: Negative.    Genitourinary: Negative.    Skin: Negative.    Neurological: Negative.    Hematological: Negative.    Psychiatric/Behavioral: Negative.      Objective     Blood pressure 122/84, pulse 91, height 162.6 cm  (64\"), weight 85.3 kg (188 lb), not currently breastfeeding.    Physical Exam  Vitals and nursing note reviewed.   Constitutional:       Appearance: Normal appearance.   HENT:      Head: Normocephalic.      Mouth/Throat:      Mouth: Mucous membranes are moist.      Pharynx: Oropharynx is clear.   Eyes:      General: No scleral icterus.  Cardiovascular:      Rate and Rhythm: Normal rate and regular rhythm.   Pulmonary:      Effort: Pulmonary effort is normal.      Breath sounds: Normal breath sounds.   Abdominal:      General: Bowel sounds are normal.      Palpations: Abdomen is soft.      Tenderness: There is abdominal tenderness in the right lower quadrant, suprapubic area and left lower quadrant. There is no right CVA tenderness, left CVA tenderness, guarding or rebound. Negative signs include Rovsing's sign, McBurney's sign, psoas sign and obturator sign.   Skin:     Coloration: Skin is not jaundiced.   Neurological:      General: No focal deficit present.      Mental Status: She is alert.   Psychiatric:         Behavior: Behavior normal.          Assessment & Plan   Diagnoses and all orders for this visit:    1. Hematochezia (Primary)  -     Case Request; Standing  -     sodium chloride 0.9 % infusion 40 mL  -     dextrose 5 % and sodium chloride 0.45 % infusion  -     Case Request    2. Left lower quadrant abdominal tenderness without rebound tenderness  -     Case Request; Standing  -     sodium chloride 0.9 % infusion 40 mL  -     dextrose 5 % and sodium chloride 0.45 % infusion  -     Case Request    3. Epigastric pain  -     Case Request; Standing  -     sodium chloride 0.9 % infusion 40 mL  -     dextrose 5 % and sodium chloride 0.45 % infusion  -     Case Request    4. Gastroesophageal reflux disease, unspecified whether esophagitis present  -     Case Request; Standing  -     sodium chloride 0.9 % infusion 40 mL  -     dextrose 5 % and sodium chloride 0.45 % infusion  -     Case Request    5.  Constipation, unspecified constipation type  -     Case Request; Standing  -     sodium chloride 0.9 % infusion 40 mL  -     dextrose 5 % and sodium chloride 0.45 % infusion  -     Case Request    Other orders  -     Follow Anesthesia Guidelines / Protocol; Future  -     Obtain Informed Consent; Future  -     Implement Anesthesia Orders Day of Procedure; Standing  -     Obtain Informed Consent; Standing  -     POC Glucose Once; Standing  -     Insert Peripheral IV; Standing  -     polyethylene glycol (MIRALAX) 17 g packet; Take 17 g by mouth Daily.  Dispense: 30 packet; Refill: 1        ESOPHAGOGASTRODUODENOSCOPY-bx (N/A), COLONOSCOPY- look at TI, bx (N/A)     Diagnosis Plan   1. Hematochezia  Case Request    sodium chloride 0.9 % infusion 40 mL    dextrose 5 % and sodium chloride 0.45 % infusion    Case Request      2. Left lower quadrant abdominal tenderness without rebound tenderness  Case Request    sodium chloride 0.9 % infusion 40 mL    dextrose 5 % and sodium chloride 0.45 % infusion    Case Request      3. Epigastric pain  Case Request    sodium chloride 0.9 % infusion 40 mL    dextrose 5 % and sodium chloride 0.45 % infusion    Case Request      4. Gastroesophageal reflux disease, unspecified whether esophagitis present  Case Request    sodium chloride 0.9 % infusion 40 mL    dextrose 5 % and sodium chloride 0.45 % infusion    Case Request      5. Constipation, unspecified constipation type  Case Request    sodium chloride 0.9 % infusion 40 mL    dextrose 5 % and sodium chloride 0.45 % infusion    Case Request          Anticipated Surgical Procedure:  Orders Placed This Encounter   Procedures   • Obtain Informed Consent     Standing Status:   Future     Order Specific Question:   Informed Consent Given For     Answer:   egd and colonoscopy       The risks, benefits, and alternatives of this procedure have been discussed with the patient or the responsible party- the patient understands and agrees to  proceed.

## 2023-01-03 ENCOUNTER — HOSPITAL ENCOUNTER (OUTPATIENT)
Facility: HOSPITAL | Age: 33
Setting detail: HOSPITAL OUTPATIENT SURGERY
Discharge: HOME | End: 2023-01-03
Attending: INTERNAL MEDICINE | Admitting: INTERNAL MEDICINE
Payer: COMMERCIAL

## 2023-01-03 ENCOUNTER — ANESTHESIA EVENT (OUTPATIENT)
Dept: GASTROENTEROLOGY | Facility: HOSPITAL | Age: 33
End: 2023-01-03
Payer: COMMERCIAL

## 2023-01-03 ENCOUNTER — ANESTHESIA (OUTPATIENT)
Dept: GASTROENTEROLOGY | Facility: HOSPITAL | Age: 33
End: 2023-01-03
Payer: COMMERCIAL

## 2023-01-03 VITALS
HEART RATE: 94 BPM | SYSTOLIC BLOOD PRESSURE: 128 MMHG | WEIGHT: 187.39 LBS | RESPIRATION RATE: 18 BRPM | TEMPERATURE: 97.1 F | OXYGEN SATURATION: 98 % | BODY MASS INDEX: 31.99 KG/M2 | DIASTOLIC BLOOD PRESSURE: 73 MMHG | HEIGHT: 64 IN

## 2023-01-03 DIAGNOSIS — K59.00 CONSTIPATION, UNSPECIFIED CONSTIPATION TYPE: ICD-10-CM

## 2023-01-03 DIAGNOSIS — K92.1 HEMATOCHEZIA: ICD-10-CM

## 2023-01-03 DIAGNOSIS — K21.9 GASTROESOPHAGEAL REFLUX DISEASE, UNSPECIFIED WHETHER ESOPHAGITIS PRESENT: ICD-10-CM

## 2023-01-03 DIAGNOSIS — R10.13 EPIGASTRIC PAIN: ICD-10-CM

## 2023-01-03 DIAGNOSIS — R10.814 LEFT LOWER QUADRANT ABDOMINAL TENDERNESS WITHOUT REBOUND TENDERNESS: ICD-10-CM

## 2023-01-03 PROCEDURE — 25010000002 PROPOFOL 10 MG/ML EMULSION: Performed by: NURSE ANESTHETIST, CERTIFIED REGISTERED

## 2023-01-03 PROCEDURE — 43239 EGD BIOPSY SINGLE/MULTIPLE: CPT | Performed by: INTERNAL MEDICINE

## 2023-01-03 PROCEDURE — 88305 TISSUE EXAM BY PATHOLOGIST: CPT

## 2023-01-03 PROCEDURE — 45380 COLONOSCOPY AND BIOPSY: CPT | Performed by: INTERNAL MEDICINE

## 2023-01-03 RX ORDER — LIDOCAINE HYDROCHLORIDE 20 MG/ML
INJECTION, SOLUTION INTRAVENOUS AS NEEDED
Status: DISCONTINUED | OUTPATIENT
Start: 2023-01-03 | End: 2023-01-03 | Stop reason: SURG

## 2023-01-03 RX ORDER — DEXTROSE AND SODIUM CHLORIDE 5; .45 G/100ML; G/100ML
30 INJECTION, SOLUTION INTRAVENOUS CONTINUOUS PRN
Status: DISCONTINUED | OUTPATIENT
Start: 2023-01-03 | End: 2023-01-03 | Stop reason: HOSPADM

## 2023-01-03 RX ORDER — PROPOFOL 10 MG/ML
VIAL (ML) INTRAVENOUS AS NEEDED
Status: DISCONTINUED | OUTPATIENT
Start: 2023-01-03 | End: 2023-01-03 | Stop reason: SURG

## 2023-01-03 RX ADMIN — DEXTROSE AND SODIUM CHLORIDE 30 ML/HR: 5; 450 INJECTION, SOLUTION INTRAVENOUS at 12:21

## 2023-01-03 RX ADMIN — LIDOCAINE HYDROCHLORIDE 100 MG: 20 INJECTION, SOLUTION INTRAVENOUS at 12:39

## 2023-01-03 RX ADMIN — PROPOFOL 30 MG: 10 INJECTION, EMULSION INTRAVENOUS at 12:46

## 2023-01-03 RX ADMIN — PROPOFOL 20 MG: 10 INJECTION, EMULSION INTRAVENOUS at 12:49

## 2023-01-03 RX ADMIN — PROPOFOL 30 MG: 10 INJECTION, EMULSION INTRAVENOUS at 12:42

## 2023-01-03 RX ADMIN — PROPOFOL 20 MG: 10 INJECTION, EMULSION INTRAVENOUS at 12:44

## 2023-01-03 RX ADMIN — PROPOFOL 20 MG: 10 INJECTION, EMULSION INTRAVENOUS at 12:48

## 2023-01-03 RX ADMIN — PROPOFOL 80 MG: 10 INJECTION, EMULSION INTRAVENOUS at 12:39

## 2023-01-03 RX ADMIN — PROPOFOL 40 MG: 10 INJECTION, EMULSION INTRAVENOUS at 12:40

## 2023-01-03 NOTE — INTERVAL H&P NOTE
H&P reviewed. The patient was examined and there are no changes to the H&P.      Risks and benefits discussed with patient. Patient understands these and would like to proceed with procedure.

## 2023-01-03 NOTE — ANESTHESIA PREPROCEDURE EVALUATION
Anesthesia Evaluation     Patient summary reviewed and Nursing notes reviewed   NPO Solid Status: > 8 hours  NPO Liquid Status: > 2 hours           Airway   Mallampati: II  TM distance: >3 FB  Neck ROM: full  No difficulty expected  Dental - normal exam     Pulmonary - negative pulmonary ROS   Cardiovascular - negative cardio ROS and normal exam        Neuro/Psych  (+) seizures (Epilepsy (HCC) last seizure 2017 ), headaches, psychiatric history Depression, Bipolar and Anxiety,    GI/Hepatic/Renal/Endo    (+) obesity,  GERD,      Musculoskeletal (-) negative ROS    Abdominal   (+) obese,    Substance History   (+) alcohol use,      OB/GYN negative ob/gyn ROS   (-)  Pregnant        Other - negative ROS                       Anesthesia Plan    ASA 2     general   total IV anesthesia  intravenous induction     Anesthetic plan, risks, benefits, and alternatives have been provided, discussed and informed consent has been obtained with: patient.        CODE STATUS:

## 2023-01-03 NOTE — ANESTHESIA POSTPROCEDURE EVALUATION
Patient: Jasmin Lovelace    Procedure Summary     Date: 01/03/23 Room / Location: [facility] ENDOSCOPY 1 / [facility] ENDOSCOPY    Anesthesia Start: 1236 Anesthesia Stop: 1254    Procedures:       ESOPHAGOGASTRODUODENOSCOPY-bx      COLONOSCOPY- look at TI, bx Diagnosis:       Hematochezia      Left lower quadrant abdominal tenderness without rebound tenderness      Epigastric pain      Gastroesophageal reflux disease, unspecified whether esophagitis present      Constipation, unspecified constipation type      (Hematochezia [K92.1])      (Left lower quadrant abdominal tenderness without rebound tenderness [R10.814])      (Epigastric pain [R10.13])      (Gastroesophageal reflux disease, unspecified whether esophagitis present [K21.9])      (Constipation, unspecified constipation type [K59.00])    Surgeons: Champ Barkley MD Provider: Jackson Melendez CRNA    Anesthesia Type: general ASA Status: 2          Anesthesia Type: general    Vitals  No vitals data found for the desired time range.          Post Anesthesia Care and Evaluation    Patient location during evaluation: bedside  Patient participation: waiting for patient participation  Level of consciousness: responsive to verbal stimuli  Pain score: 0  Pain management: adequate    Airway patency: patent  Anesthetic complications: No anesthetic complications  PONV Status: none  Cardiovascular status: acceptable and stable  Respiratory status: acceptable, room air and spontaneous ventilation  Hydration status: acceptable    Comments: BP:  137/82  HR:  92  SAT:  98  RR:  16  TEMP:  98.0

## 2023-01-04 LAB — REF LAB TEST METHOD: NORMAL

## 2023-01-04 NOTE — NURSING NOTE
Post procedure day 1, patient called back to inquire about a sharp constant pain in her abdominal area after colonoscopy, patient states nothing alleviates pain and that it is worse when pressed on. Pt instructed to seek medical help with PCP or ER at time.

## 2023-01-10 ENCOUNTER — OFFICE VISIT (OUTPATIENT)
Dept: GASTROENTEROLOGY | Facility: CLINIC | Age: 33
End: 2023-01-10
Payer: COMMERCIAL

## 2023-01-10 VITALS
DIASTOLIC BLOOD PRESSURE: 78 MMHG | WEIGHT: 190 LBS | SYSTOLIC BLOOD PRESSURE: 124 MMHG | HEIGHT: 64 IN | HEART RATE: 95 BPM | BODY MASS INDEX: 32.44 KG/M2

## 2023-01-10 DIAGNOSIS — K59.00 CONSTIPATION, UNSPECIFIED CONSTIPATION TYPE: ICD-10-CM

## 2023-01-10 DIAGNOSIS — K21.00 GASTROESOPHAGEAL REFLUX DISEASE WITH ESOPHAGITIS WITHOUT HEMORRHAGE: Primary | ICD-10-CM

## 2023-01-10 PROCEDURE — 99214 OFFICE O/P EST MOD 30 MIN: CPT | Performed by: PHYSICIAN ASSISTANT

## 2023-01-10 RX ORDER — PANTOPRAZOLE SODIUM 40 MG/1
40 TABLET, DELAYED RELEASE ORAL DAILY
Qty: 30 TABLET | Refills: 5 | Status: SHIPPED | OUTPATIENT
Start: 2023-01-10

## 2023-01-10 NOTE — PROGRESS NOTES
[facility]      Chief Complaint:   Chief Complaint   Patient presents with   • Follow-up       Subjective     HPI:   Patient presents for follow-up after having endoscopy completed due to abdominal pain, hematochezia and a longstanding history of constipation.  Patient also underwent EGD for further evaluation of GERD despite taking medication.  Patient previously had a CT scan of the abdomen pelvis that showed no acute inflammatory process.  Patient states she had some difficulty with the endoscopy procedures including abdominal pain, bloating and continued constipation.  Patient states she went to the ER the day after procedure and had laboratory studies and CT scan however these were done in [place] and she does not have a copy and the chart is not available on care everywhere.  Patient states that she was not given any medication and was not admitted.    EGD showed grade 3 esophagitis and gastritis.  Biopsies of the small bowel showed no significant histologic abnormality.  Antral biopsy shows reactive gastropathy and esophageal biopsy shows reactive squamous mucosa.  Colonoscopy showed inadequate prep but documentation shows colonoscope was passed without difficulty to the cecum and biopsies were taken from the terminal ileum.  Terminal ileal biopsy shows no significant histologic abnormality and colonic biopsies also show no significant histologic abnormality.    Assessment/plan:  1.  GERD with esophagitis- patient will be changed from Pepcid to Protonix 40 mg each morning.  She may continue Pepcid for 10 to 14 days until Protonix has had time to become effective.  Discussed gastric irritant avoidance.  2.  Chronic constipation- patient will be prescribed Linzess 145 mcg for constipation.  Patient has had no relief of symptoms with MiraLAX, fiber and over-the-counter laxatives.  Patient advised to start Protonix at least 1 week prior to starting Linzess so that  if she has a problem taking the medications we will have a better idea which medication is causing the problem.  Follow-up planned in 1 month.  Patient advised to contact the office for any problems prior to that time.  Recommend repeat screening colonoscopy at age 52.    Past Medical History:   Past Medical History:   Diagnosis Date   • Anxiety    • Bipolar disorder (HCC)    • Depression    • Endometriosis    • Epilepsy (HCC)     last seizure 2017   • Migraine headache    • Ovarian cyst    • Urinary tract infection        Past Surgical History:    Past Surgical History:   Procedure Laterality Date   • COLONOSCOPY N/A 1/3/2023    Procedure: COLONOSCOPY- look at TI, bx;  Surgeon: Champ Barkley MD;  Location: [facility] ENDOSCOPY;  Service: Gastroenterology;  Laterality: N/A;   • ENDOSCOPY N/A 1/3/2023    Procedure: ESOPHAGOGASTRODUODENOSCOPY-bx;  Surgeon: Chmap Barkley MD;  Location: [facility] ENDOSCOPY;  Service: Gastroenterology;  Laterality: N/A;   • TONSILLECTOMY     • WISDOM TOOTH EXTRACTION  08/2020       Family History:  Family History   Problem Relation Age of Onset   • Uterine cancer Mother    • Ovarian cancer Mother    • Breast cancer Paternal Grandmother        Social History:   reports that she has never smoked. She has never used smokeless tobacco. She reports current alcohol use. She reports that she does not use drugs.    Medications:   Prior to Admission medications    Medication Sig Start Date End Date Taking? Authorizing Provider   cetirizine (zyrTEC) 10 MG tablet Take 10 mg by mouth Daily.    ProviderKuldeep MD   fexofenadine (ALLEGRA) 180 MG tablet Take 180 mg by mouth Daily. 8/13/21   Kuldeep Francisco MD   linaclotide (Linzess) 145 MCG capsule capsule Take 1 capsule by mouth Every Morning Before Breakfast. 1/10/23   Melinda Foster PA-C   mometasone (ELOCON) 0.1 % cream APPLY TOPICALLY TO AFFECTED AREA TWICE DAILY AVOID USE ON FACE 8/13/21   Provider, Historical, MD   montelukast  (SINGULAIR) 10 MG tablet Take 10 mg by mouth every night at bedtime. 8/26/20   Kuldeep Francisco MD   Multiple Vitamin (MULTI-VITAMIN DAILY PO) Take  by mouth.    Kuldeep Francisco MD   Norethin-Eth Estrad-Fe Biphas (Lo Loestrin Fe) 1 MG-10 MCG / 10 MCG tablet Take 1 tablet by mouth Daily. 12/2/22   Marilee Holm APRN   ondansetron (ZOFRAN) 4 MG tablet TAKE 1 TABLET BY MOUTH EVERY 4 TO 6 HOURS AS NEEDED 8/18/20   Kuldeep Francisco MD   pantoprazole (PROTONIX) 40 MG EC tablet Take 1 tablet by mouth Daily. 1/10/23   Melinda Foster PA-C   polyethylene glycol (MIRALAX) 17 g packet Take 17 g by mouth Daily. 12/9/22   Melinda Foster PA-C   Trokendi  MG capsule sustained-release 24 hr Take 1 capsule by mouth Daily. 9/3/20   Kuldeep Francisco MD   ubrogepant tablet TAKE 1 TABLET BY MOUTH AT ONSET OF MIGRAINE AND IN 2 HOURS MAY REPEAT DOSE... DO NOT EXCEED 2 TABLETS IN A 24 HOUR PERIOD 9/3/20   Kuldeep Francisco MD   venlafaxine XR (EFFEXOR-XR) 37.5 MG 24 hr capsule Take 37.5 mg by mouth Daily.    Kuldeep Francisco MD       Allergies:  Patient has no known allergies.    ROS:    Review of Systems   Constitutional: Positive for unexpected weight change (gained 5 lbs). Negative for fever.   HENT: Negative.  Negative for trouble swallowing.    Eyes: Negative.    Respiratory: Negative.  Negative for shortness of breath.    Cardiovascular: Negative.  Negative for chest pain.   Gastrointestinal: Positive for abdominal distention, abdominal pain and constipation. Negative for anal bleeding, blood in stool, diarrhea, nausea, rectal pain and vomiting.   Endocrine: Negative.    Genitourinary: Negative.    Skin: Negative.    Neurological: Negative.    Hematological: Negative.    Psychiatric/Behavioral: Negative.      Objective     Blood pressure 124/78, pulse 95, height 162.6 cm (64\"), weight 86.2 kg (190 lb), not currently breastfeeding.    Physical Exam  Vitals and nursing note  reviewed.   Constitutional:       Appearance: Normal appearance.   HENT:      Head: Normocephalic and atraumatic.   Eyes:      General: No scleral icterus.     Conjunctiva/sclera: Conjunctivae normal.   Cardiovascular:      Rate and Rhythm: Normal rate and regular rhythm.   Pulmonary:      Effort: Pulmonary effort is normal.      Breath sounds: Normal breath sounds.   Abdominal:      General: Bowel sounds are normal.      Palpations: Abdomen is soft.      Tenderness: There is abdominal tenderness in the epigastric area. There is no guarding or rebound.   Skin:     General: Skin is warm.   Neurological:      General: No focal deficit present.      Mental Status: She is alert.   Psychiatric:         Behavior: Behavior normal.          Assessment & Plan   Diagnoses and all orders for this visit:    1. Gastroesophageal reflux disease with esophagitis without hemorrhage (Primary)    2. Constipation, unspecified constipation type    Other orders  -     pantoprazole (PROTONIX) 40 MG EC tablet; Take 1 tablet by mouth Daily.  Dispense: 30 tablet; Refill: 5  -     linaclotide (Linzess) 145 MCG capsule capsule; Take 1 capsule by mouth Every Morning Before Breakfast.  Dispense: 30 capsule; Refill: 2        * Surgery not found *     Diagnosis Plan   1. Gastroesophageal reflux disease with esophagitis without hemorrhage        2. Constipation, unspecified constipation type            Anticipated Surgical Procedure:  No orders of the defined types were placed in this encounter.      The risks, benefits, and alternatives of this procedure have been discussed with the patient or the responsible party- the patient understands and agrees to proceed.

## 2023-02-07 ENCOUNTER — OFFICE VISIT (OUTPATIENT)
Dept: GASTROENTEROLOGY | Facility: CLINIC | Age: 33
End: 2023-02-07
Payer: COMMERCIAL

## 2023-02-07 VITALS
DIASTOLIC BLOOD PRESSURE: 76 MMHG | HEIGHT: 64 IN | WEIGHT: 188 LBS | HEART RATE: 98 BPM | BODY MASS INDEX: 32.1 KG/M2 | SYSTOLIC BLOOD PRESSURE: 122 MMHG

## 2023-02-07 DIAGNOSIS — K59.00 CONSTIPATION, UNSPECIFIED CONSTIPATION TYPE: ICD-10-CM

## 2023-02-07 DIAGNOSIS — K21.00 GASTROESOPHAGEAL REFLUX DISEASE WITH ESOPHAGITIS WITHOUT HEMORRHAGE: Primary | ICD-10-CM

## 2023-02-07 PROCEDURE — 99213 OFFICE O/P EST LOW 20 MIN: CPT | Performed by: PHYSICIAN ASSISTANT

## 2023-02-07 NOTE — PROGRESS NOTES
[facility]      Chief Complaint:   Chief Complaint   Patient presents with   • Follow-up       Subjective     HPI:   Patient presents for 1 month follow-up after being started on medication for GERD and chronic constipation.  At her last visit, patient was started on Protonix 40 mg once daily and Linzess 145 mcg daily.  Patient happily reports today that all of her symptoms have resolved.  Patient states she is no longer having nausea or heartburn symptoms and is currently having normal, daily bowel movements.  Patient denies any bleeding with bowel movements.    Previous EGD showed grade 3 esophagitis and gastritis.  Colonoscopy was normal with normal biopsies.  It was recommended that patient have repeat screening colonoscopy at the age of 52.    Assessment/plan:  1.  GERD with esophagitis-patient will continue Protonix 40 mg daily.  She will continue avoidance of gastric irritants.  2.  Chronic constipation- patient will continue Linzess 145 mcg daily.    Follow-up will be planned in 3 months.  Patient advised to contact the office in the interim if she has any recurrence of symptoms or new/worsening symptoms.    Past Medical History:   Past Medical History:   Diagnosis Date   • Anxiety    • Bipolar disorder (HCC)    • Depression    • Endometriosis    • Epilepsy (HCC)     last seizure 2017   • Migraine headache    • Ovarian cyst    • Urinary tract infection        Past Surgical History:    Past Surgical History:   Procedure Laterality Date   • COLONOSCOPY N/A 1/3/2023    Procedure: COLONOSCOPY- look at TI, bx;  Surgeon: Champ Barkley MD;  Location: [facility] ENDOSCOPY;  Service: Gastroenterology;  Laterality: N/A;   • ENDOSCOPY N/A 1/3/2023    Procedure: ESOPHAGOGASTRODUODENOSCOPY-bx;  Surgeon: Champ Barkley MD;  Location: [facility] ENDOSCOPY;  Service: Gastroenterology;  Laterality: N/A;   • TONSILLECTOMY     • WISDOM TOOTH EXTRACTION  08/2020       Family  History:  Family History   Problem Relation Age of Onset   • Uterine cancer Mother    • Ovarian cancer Mother    • Breast cancer Paternal Grandmother        Social History:   reports that she has never smoked. She has never used smokeless tobacco. She reports current alcohol use. She reports that she does not use drugs.    Medications:   Prior to Admission medications    Medication Sig Start Date End Date Taking? Authorizing Provider   linaclotide (Linzess) 145 MCG capsule capsule Take 1 capsule by mouth Every Morning Before Breakfast. 2/7/23  Yes Melinda Foster PA-C   cetirizine (zyrTEC) 10 MG tablet Take 10 mg by mouth Daily.    Kuldeep Francisco MD   fexofenadine (ALLEGRA) 180 MG tablet Take 180 mg by mouth Daily. 8/13/21   Kuldeep Francisco MD   mometasone (ELOCON) 0.1 % cream APPLY TOPICALLY TO AFFECTED AREA TWICE DAILY AVOID USE ON FACE 8/13/21   Kuldeep Francisco MD   montelukast (SINGULAIR) 10 MG tablet Take 10 mg by mouth every night at bedtime. 8/26/20   Kuldeep Francisco MD   Multiple Vitamin (MULTI-VITAMIN DAILY PO) Take  by mouth.    Kuldeep Francisco MD   Norethin-Eth Estrad-Fe Biphas (Lo Loestrin Fe) 1 MG-10 MCG / 10 MCG tablet Take 1 tablet by mouth Daily. 12/2/22   Marilee Holm APRN   ondansetron (ZOFRAN) 4 MG tablet TAKE 1 TABLET BY MOUTH EVERY 4 TO 6 HOURS AS NEEDED 8/18/20   Kuldeep Francisco MD   pantoprazole (PROTONIX) 40 MG EC tablet Take 1 tablet by mouth Daily. 1/10/23   Melinda Foster PA-C   polyethylene glycol (MIRALAX) 17 g packet Take 17 g by mouth Daily. 12/9/22   Melinda Foster PA-C   Trokendi  MG capsule sustained-release 24 hr Take 1 capsule by mouth Daily. 9/3/20   Kuldeep Francisco MD   ubrogepant tablet TAKE 1 TABLET BY MOUTH AT ONSET OF MIGRAINE AND IN 2 HOURS MAY REPEAT DOSE... DO NOT EXCEED 2 TABLETS IN A 24 HOUR PERIOD 9/3/20   Kuldeep Francisco MD   venlafaxine XR (EFFEXOR-XR) 37.5 MG 24 hr capsule Take 37.5 mg by  "mouth Daily.    Provider, MD Kuldeep   linaclotide (Linzess) 145 MCG capsule capsule Take 1 capsule by mouth Every Morning Before Breakfast. 1/10/23 2/7/23  Melinda Foster PA-C       Allergies:  Patient has no known allergies.    ROS:    Review of Systems   Constitutional: Negative.  Negative for fever.   HENT: Negative.    Eyes: Negative.    Respiratory: Negative.  Negative for shortness of breath.    Cardiovascular: Negative.  Negative for chest pain.   Gastrointestinal: Negative.    Endocrine: Negative.    Genitourinary: Negative.    Skin: Negative.    Neurological: Negative.    Hematological: Negative.    Psychiatric/Behavioral: Negative.      Objective     Blood pressure 122/76, pulse 98, height 162.6 cm (64\"), weight 85.3 kg (188 lb), not currently breastfeeding.    Physical Exam  Vitals and nursing note reviewed.   Constitutional:       Appearance: Normal appearance.   HENT:      Head: Normocephalic and atraumatic.   Cardiovascular:      Rate and Rhythm: Normal rate and regular rhythm.   Pulmonary:      Effort: Pulmonary effort is normal.      Breath sounds: Normal breath sounds.   Abdominal:      General: Bowel sounds are normal.      Palpations: Abdomen is soft.      Tenderness: There is no abdominal tenderness. There is no guarding or rebound.   Neurological:      General: No focal deficit present.      Mental Status: She is alert.   Psychiatric:         Behavior: Behavior normal.          Assessment & Plan   Diagnoses and all orders for this visit:    1. Gastroesophageal reflux disease with esophagitis without hemorrhage (Primary)    2. Constipation, unspecified constipation type    Other orders  -     linaclotide (Linzess) 145 MCG capsule capsule; Take 1 capsule by mouth Every Morning Before Breakfast.  Dispense: 30 capsule; Refill: 4        * Surgery not found *     Diagnosis Plan   1. Gastroesophageal reflux disease with esophagitis without hemorrhage        2. Constipation, unspecified " constipation type            Anticipated Surgical Procedure:  No orders of the defined types were placed in this encounter.      The risks, benefits, and alternatives of this procedure have been discussed with the patient or the responsible party- the patient understands and agrees to proceed.

## 2023-05-11 ENCOUNTER — OFFICE VISIT (OUTPATIENT)
Dept: GASTROENTEROLOGY | Facility: CLINIC | Age: 33
End: 2023-05-11
Payer: COMMERCIAL

## 2023-05-11 VITALS
DIASTOLIC BLOOD PRESSURE: 84 MMHG | WEIGHT: 190 LBS | HEART RATE: 91 BPM | SYSTOLIC BLOOD PRESSURE: 120 MMHG | BODY MASS INDEX: 32.44 KG/M2 | HEIGHT: 64 IN

## 2023-05-11 DIAGNOSIS — K59.00 CONSTIPATION, UNSPECIFIED CONSTIPATION TYPE: ICD-10-CM

## 2023-05-11 DIAGNOSIS — K21.00 GASTROESOPHAGEAL REFLUX DISEASE WITH ESOPHAGITIS WITHOUT HEMORRHAGE: Primary | ICD-10-CM

## 2023-05-11 RX ORDER — PANTOPRAZOLE SODIUM 40 MG/1
40 TABLET, DELAYED RELEASE ORAL 2 TIMES DAILY
Qty: 180 TABLET | Refills: 1 | Status: SHIPPED | OUTPATIENT
Start: 2023-05-11

## 2023-05-11 NOTE — PROGRESS NOTES
[facility]      Chief Complaint:   Chief Complaint   Patient presents with   • Follow-up       Subjective     HPI:   Patient presents for 3-month follow-up related to GERD with esophagitis and chronic constipation.  At her last visit, patient reported that she was doing very well and most of her symptoms had resolved with Protonix 40 mg once daily and Linzess 145 mcg daily.  Today, patient states that her acid reflux symptoms are not completely controlled with Protonix now.  Patient states that she takes her medicine first thing in the morning and notices around 3 to 4 PM her symptoms return.  She is taking Pepcid 20 mg most afternoons.  She denies NSAIDs and reports that she is continuing to avoid gastric irritants.  Patient reports that her Linzess is still helping with her constipation.  She states she has a bowel movement daily or every other day.  She denies bleeding with bowel movements.  Patient denies abdominal pain, nausea/vomiting and dysphagia.    Assessment/plan:  1.  GERD with esophagitis- I will increase Protonix to 40 mg twice daily.  2.  Chronic constipation-patient will continue Linzess 145 mcg daily.    Follow-up is planned for 8 weeks to determine if medication change has been effective in alleviating her symptoms.  Patient advised to contact the office sooner for any new or worsening symptoms    Past Medical History:   Past Medical History:   Diagnosis Date   • Anxiety    • Bipolar disorder    • Depression    • Endometriosis    • Epilepsy     last seizure 2017   • Migraine headache    • Ovarian cyst    • Urinary tract infection        Past Surgical History:    Past Surgical History:   Procedure Laterality Date   • COLONOSCOPY N/A 1/3/2023    Procedure: COLONOSCOPY- look at TI, bx;  Surgeon: Champ Barkley MD;  Location: [facility] ENDOSCOPY;  Service: Gastroenterology;  Laterality: N/A;   • ENDOSCOPY N/A 1/3/2023    Procedure:  ESOPHAGOGASTRODUODENOSCOPY-bx;  Surgeon: Champ Barkley MD;  Location: [facility] ENDOSCOPY;  Service: Gastroenterology;  Laterality: N/A;   • TONSILLECTOMY     • WISDOM TOOTH EXTRACTION  08/2020       Family History:  Family History   Problem Relation Age of Onset   • Uterine cancer Mother    • Ovarian cancer Mother    • Breast cancer Paternal Grandmother        Social History:   reports that she has never smoked. She has never used smokeless tobacco. She reports current alcohol use. She reports that she does not use drugs.    Medications:   Prior to Admission medications    Medication Sig Start Date End Date Taking? Authorizing Provider   cetirizine (zyrTEC) 10 MG tablet Take 1 tablet by mouth Daily.   Yes Kuldeep Francisco MD   fexofenadine (ALLEGRA) 180 MG tablet Take 1 tablet by mouth Daily. 8/13/21  Yes Kuldeep Francisco MD   linaclotide (Linzess) 145 MCG capsule capsule Take 1 capsule by mouth Every Morning Before Breakfast. 2/7/23  Yes Melinda Foster PA-C   mometasone (ELOCON) 0.1 % cream APPLY TOPICALLY TO AFFECTED AREA TWICE DAILY AVOID USE ON FACE 8/13/21  Yes ProviderKuldeep MD   montelukast (SINGULAIR) 10 MG tablet Take 1 tablet by mouth every night at bedtime. 8/26/20  Yes Kuldeep Francisco MD   Multiple Vitamin (MULTI-VITAMIN DAILY PO) Take  by mouth.   Yes Kuldeep Francisco MD   Norethin-Eth Estrad-Fe Biphas (Lo Loestrin Fe) 1 MG-10 MCG / 10 MCG tablet Take 1 tablet by mouth Daily. 12/2/22  Yes Marilee Holm APRN   ondansetron (ZOFRAN) 4 MG tablet TAKE 1 TABLET BY MOUTH EVERY 4 TO 6 HOURS AS NEEDED 8/18/20  Yes Kuldeep Francisco MD   Trokendi  MG capsule sustained-release 24 hr Take 1 capsule by mouth Daily. 9/3/20  Yes Kuldeep Francisco MD   ubrogepant tablet TAKE 1 TABLET BY MOUTH AT ONSET OF MIGRAINE AND IN 2 HOURS MAY REPEAT DOSE... DO NOT EXCEED 2 TABLETS IN A 24 HOUR PERIOD 9/3/20  Yes Kuldeep Francisco MD   venlafaxine XR (EFFEXOR-XR)  "37.5 MG 24 hr capsule Take 1 capsule by mouth Daily.   Yes Provider, MD Kuldeep   pantoprazole (PROTONIX) 40 MG EC tablet Take 1 tablet by mouth Daily. 1/10/23 5/11/23 Yes Melinda Foster PA-C   pantoprazole (PROTONIX) 40 MG EC tablet Take 1 tablet by mouth 2 (Two) Times a Day. 5/11/23   Melinda Foster PA-C   polyethylene glycol (MIRALAX) 17 g packet Take 17 g by mouth Daily. 12/9/22 5/11/23  Melinda Foster PA-C       Allergies:  Patient has no known allergies.    ROS:    Review of Systems   Constitutional: Negative.  Negative for fever and unexpected weight change.   HENT: Negative for trouble swallowing.    Eyes: Negative.    Respiratory: Negative.  Negative for shortness of breath.    Cardiovascular: Negative.  Negative for chest pain.   Gastrointestinal: Negative.  Negative for abdominal distention, abdominal pain, anal bleeding, blood in stool, constipation, diarrhea, nausea, rectal pain and vomiting.        GERD   Endocrine: Negative.    Genitourinary: Negative.    Skin: Negative.    Neurological: Positive for headaches.   Hematological: Negative.    Psychiatric/Behavioral: Negative.      Objective     Blood pressure 120/84, pulse 91, height 162.6 cm (64\"), weight 86.2 kg (190 lb), not currently breastfeeding.    Physical Exam  Vitals and nursing note reviewed.   Constitutional:       Appearance: Normal appearance.   HENT:      Head: Normocephalic and atraumatic.   Cardiovascular:      Rate and Rhythm: Normal rate and regular rhythm.   Pulmonary:      Effort: Pulmonary effort is normal.      Breath sounds: Normal breath sounds.   Abdominal:      General: Bowel sounds are normal.      Palpations: Abdomen is soft.      Tenderness: There is no abdominal tenderness. There is no guarding or rebound.   Skin:     General: Skin is warm.      Coloration: Skin is not jaundiced.   Neurological:      General: No focal deficit present.      Mental Status: She is alert.   Psychiatric:         Behavior: Behavior " normal.          Assessment & Plan   Diagnoses and all orders for this visit:    1. Gastroesophageal reflux disease with esophagitis without hemorrhage (Primary)    2. Constipation, unspecified constipation type    Other orders  -     pantoprazole (PROTONIX) 40 MG EC tablet; Take 1 tablet by mouth 2 (Two) Times a Day.  Dispense: 180 tablet; Refill: 1        * Surgery not found *     Diagnosis Plan   1. Gastroesophageal reflux disease with esophagitis without hemorrhage        2. Constipation, unspecified constipation type            Anticipated Surgical Procedure:  No orders of the defined types were placed in this encounter.      The risks, benefits, and alternatives of this procedure have been discussed with the patient or the responsible party- the patient understands and agrees to proceed.

## 2023-08-31 ENCOUNTER — OFFICE VISIT (OUTPATIENT)
Dept: GASTROENTEROLOGY | Facility: CLINIC | Age: 33
End: 2023-08-31
Payer: COMMERCIAL

## 2023-08-31 VITALS
HEART RATE: 91 BPM | SYSTOLIC BLOOD PRESSURE: 118 MMHG | BODY MASS INDEX: 31.92 KG/M2 | WEIGHT: 187 LBS | HEIGHT: 64 IN | DIASTOLIC BLOOD PRESSURE: 81 MMHG

## 2023-08-31 DIAGNOSIS — K21.00 GASTROESOPHAGEAL REFLUX DISEASE WITH ESOPHAGITIS WITHOUT HEMORRHAGE: Primary | ICD-10-CM

## 2023-08-31 DIAGNOSIS — K59.00 CONSTIPATION, UNSPECIFIED CONSTIPATION TYPE: ICD-10-CM

## 2023-08-31 RX ORDER — ESOMEPRAZOLE MAGNESIUM 40 MG/1
40 CAPSULE, DELAYED RELEASE ORAL DAILY
Qty: 30 CAPSULE | Refills: 5 | Status: SHIPPED | OUTPATIENT
Start: 2023-08-31

## 2023-08-31 NOTE — PROGRESS NOTES
"[facility]      Chief Complaint:   Chief Complaint   Patient presents with    Follow-up       Subjective     HPI:   Patient presents for follow-up after increasing Protonix to twice daily to help control her GERD symptoms.  Patient also has history of constipation and is currently taking Linzess 145 mcg daily.    Patient was previously taking Protonix twice daily and Pepcid twice daily for relief of uncontrolled GERD symptoms.  Patient reports today that she has discontinued Pepcid but is still taking Protonix twice daily.  Patient states she continues to have \"sour stomach \"several times per day.  She also states today that Linzess is now causing her to have diarrhea, indicates McKenzie stool type VI-VII.  She denies any further episodes of left lower quadrant pain or hematochezia since her constipation has improved.    Assessment/plan:  1.  GERD with esophagitis-stop Protonix, start Nexium 40 mg once daily in its place.  Patient advised to continue to avoid NSAIDs and other gastric irritants.  2.  Chronic constipation-patient will be given samples of Linzess 72 mcg daily instead of 145 to determine if this will help give her more formed stools instead of diarrhea.  Patient states Linzess is very effective in alleviating her abdominal pain.    Patient will follow-up in 1 month for recheck or sooner for problems.    Past Medical History:   Past Medical History:   Diagnosis Date    Anxiety     Bipolar disorder     Depression     Endometriosis     Epilepsy     last seizure 2017    Migraine headache     Ovarian cyst     Urinary tract infection        Past Surgical History:    Past Surgical History:   Procedure Laterality Date    COLONOSCOPY N/A 1/3/2023    Procedure: COLONOSCOPY- look at TI, bx;  Surgeon: Champ Barkley MD;  Location: [facility] ENDOSCOPY;  Service: Gastroenterology;  Laterality: N/A;    ENDOSCOPY N/A 1/3/2023    Procedure: " ESOPHAGOGASTRODUODENOSCOPY-bx;  Surgeon: Champ Barkley MD;  Location: [facility] ENDOSCOPY;  Service: Gastroenterology;  Laterality: N/A;    TONSILLECTOMY      WISDOM TOOTH EXTRACTION  08/2020       Family History:  Family History   Problem Relation Age of Onset    Uterine cancer Mother     Ovarian cancer Mother     Breast cancer Paternal Grandmother        Social History:   reports that she has never smoked. She has never used smokeless tobacco. She reports current alcohol use. She reports that she does not use drugs.    Medications:   Prior to Admission medications    Medication Sig Start Date End Date Taking? Authorizing Provider   cetirizine (zyrTEC) 10 MG tablet Take 1 tablet by mouth Daily.   Yes Kuldeep Francisco MD   fexofenadine (ALLEGRA) 180 MG tablet Take 1 tablet by mouth Daily. 8/13/21  Yes Kuldeep Francisco MD   linaclotide (Linzess) 145 MCG capsule capsule Take 1 capsule by mouth Every Morning Before Breakfast. 2/7/23  Yes Melinda Foster PA-C   mometasone (ELOCON) 0.1 % cream APPLY TOPICALLY TO AFFECTED AREA TWICE DAILY AVOID USE ON FACE 8/13/21  Yes ProviderKuldeep MD   montelukast (SINGULAIR) 10 MG tablet Take 1 tablet by mouth every night at bedtime. 8/26/20  Yes Kuldeep Francisco MD   Multiple Vitamin (MULTI-VITAMIN DAILY PO) Take  by mouth.   Yes ProviderKuldeep MD   Norethin-Eth Estrad-Fe Biphas (Lo Loestrin Fe) 1 MG-10 MCG / 10 MCG tablet Take 1 tablet by mouth Daily. 12/2/22  Yes Marilee Holm APRN   ondansetron (ZOFRAN) 4 MG tablet TAKE 1 TABLET BY MOUTH EVERY 4 TO 6 HOURS AS NEEDED 8/18/20  Yes Kuldeep Francisco MD   Trokendi  MG capsule sustained-release 24 hr Take 1 capsule by mouth Daily. 9/3/20  Yes Kuldeep Francisco MD   ubrogepant tablet TAKE 1 TABLET BY MOUTH AT ONSET OF MIGRAINE AND IN 2 HOURS MAY REPEAT DOSE... DO NOT EXCEED 2 TABLETS IN A 24 HOUR PERIOD 9/3/20  Yes Kuldeep Francisco MD   venlafaxine XR (EFFEXOR-XR)  "37.5 MG 24 hr capsule Take 1 capsule by mouth Daily.   Yes Provider, MD Kuldeep   pantoprazole (PROTONIX) 40 MG EC tablet Take 1 tablet by mouth 2 (Two) Times a Day. 5/11/23 8/31/23 Yes Melinda Foster PA-C   esomeprazole (nexIUM) 40 MG capsule Take 1 capsule by mouth Daily. 8/31/23   Melinda Foster PA-C       Allergies:  Patient has no known allergies.    ROS:    Review of Systems   Constitutional: Negative.  Negative for fever and unexpected weight change.   HENT: Negative.     Eyes: Negative.    Respiratory: Negative.  Negative for shortness of breath.    Cardiovascular: Negative.  Negative for chest pain.   Gastrointestinal:  Positive for diarrhea. Negative for abdominal pain.        Sour stomach     Endocrine: Negative.    Genitourinary: Negative.    Skin: Negative.    Neurological: Negative.    Hematological: Negative.    Psychiatric/Behavioral: Negative.     Objective     Blood pressure 118/81, pulse 91, height 162.6 cm (64\"), weight 84.8 kg (187 lb), not currently breastfeeding.    Physical Exam  Vitals and nursing note reviewed.   Constitutional:       Appearance: Normal appearance.   HENT:      Head: Normocephalic and atraumatic.   Eyes:      General: No scleral icterus.  Cardiovascular:      Rate and Rhythm: Normal rate and regular rhythm.   Pulmonary:      Effort: Pulmonary effort is normal.      Breath sounds: Normal breath sounds.   Abdominal:      General: Bowel sounds are normal.      Palpations: Abdomen is soft.      Tenderness: There is no abdominal tenderness.   Skin:     General: Skin is warm.      Coloration: Skin is not jaundiced.   Neurological:      General: No focal deficit present.      Mental Status: She is alert.   Psychiatric:         Behavior: Behavior normal.        Assessment & Plan   Diagnoses and all orders for this visit:    1. Gastroesophageal reflux disease with esophagitis without hemorrhage (Primary)    2. Constipation, unspecified constipation type    Other orders  -    "  esomeprazole (nexIUM) 40 MG capsule; Take 1 capsule by mouth Daily.  Dispense: 30 capsule; Refill: 5        * Surgery not found *     Diagnosis Plan   1. Gastroesophageal reflux disease with esophagitis without hemorrhage        2. Constipation, unspecified constipation type            Anticipated Surgical Procedure:  No orders of the defined types were placed in this encounter.      The risks, benefits, and alternatives of this procedure have been discussed with the patient or the responsible party- the patient understands and agrees to proceed.

## (undated) DEVICE — SINGLE-USE BIOPSY FORCEPS: Brand: RADIAL JAW 4

## (undated) DEVICE — BITEBLOCK ENDO W/STRAP 60F A/ LF DISP